# Patient Record
Sex: MALE | Race: WHITE | Employment: OTHER | URBAN - METROPOLITAN AREA
[De-identification: names, ages, dates, MRNs, and addresses within clinical notes are randomized per-mention and may not be internally consistent; named-entity substitution may affect disease eponyms.]

---

## 2017-01-05 ENCOUNTER — HOSPITAL ENCOUNTER (OUTPATIENT)
Dept: LAB | Age: 72
Discharge: HOME | End: 2017-01-05
Payer: MEDICARE

## 2017-01-05 ENCOUNTER — OFFICE VISIT (OUTPATIENT)
Dept: UROLOGY | Age: 72
End: 2017-01-05

## 2017-01-05 VITALS
WEIGHT: 253 LBS | SYSTOLIC BLOOD PRESSURE: 138 MMHG | BODY MASS INDEX: 35.42 KG/M2 | DIASTOLIC BLOOD PRESSURE: 76 MMHG | TEMPERATURE: 96.9 F | HEART RATE: 95 BPM | HEIGHT: 71 IN | OXYGEN SATURATION: 96 %

## 2017-01-05 DIAGNOSIS — R35.0 URINARY FREQUENCY: ICD-10-CM

## 2017-01-05 DIAGNOSIS — N41.1 CHRONIC PROSTATITIS: Primary | ICD-10-CM

## 2017-01-05 DIAGNOSIS — N40.1 BENIGN PROSTATIC HYPERPLASIA WITH LOWER URINARY TRACT SYMPTOMS, UNSPECIFIED MORPHOLOGY: ICD-10-CM

## 2017-01-05 DIAGNOSIS — Z01.818 PREOPERATIVE TESTING: ICD-10-CM

## 2017-01-05 LAB
ANION GAP BLD CALC-SCNC: 7 MMOL/L (ref 3–18)
BUN SERPL-MCNC: 30 MG/DL (ref 7–18)
BUN/CREAT SERPL: 17 (ref 12–20)
CALCIUM SERPL-MCNC: 8.9 MG/DL (ref 8.5–10.1)
CHLORIDE SERPL-SCNC: 99 MMOL/L (ref 100–108)
CO2 SERPL-SCNC: 31 MMOL/L (ref 21–32)
CREAT SERPL-MCNC: 1.78 MG/DL (ref 0.6–1.3)
ERYTHROCYTE [DISTWIDTH] IN BLOOD BY AUTOMATED COUNT: 13.3 % (ref 11.6–14.5)
GLUCOSE SERPL-MCNC: 68 MG/DL (ref 74–99)
HCT VFR BLD AUTO: 45.2 % (ref 36–48)
HGB BLD-MCNC: 15.2 G/DL (ref 13–16)
MCH RBC QN AUTO: 29.7 PG (ref 24–34)
MCHC RBC AUTO-ENTMCNC: 33.6 G/DL (ref 31–37)
MCV RBC AUTO: 88.3 FL (ref 74–97)
PLATELET # BLD AUTO: 198 K/UL (ref 135–420)
PMV BLD AUTO: 10.8 FL (ref 9.2–11.8)
POTASSIUM SERPL-SCNC: 4.5 MMOL/L (ref 3.5–5.5)
RBC # BLD AUTO: 5.12 M/UL (ref 4.7–5.5)
SODIUM SERPL-SCNC: 137 MMOL/L (ref 136–145)
WBC # BLD AUTO: 9.5 K/UL (ref 4.6–13.2)

## 2017-01-05 PROCEDURE — 80048 BASIC METABOLIC PNL TOTAL CA: CPT | Performed by: UROLOGY

## 2017-01-05 PROCEDURE — 85027 COMPLETE CBC AUTOMATED: CPT | Performed by: UROLOGY

## 2017-01-05 RX ORDER — BISMUTH SUBSALICYLATE 262 MG
1 TABLET,CHEWABLE ORAL DAILY
COMMUNITY
End: 2017-01-06

## 2017-01-05 RX ORDER — ACETAMINOPHEN 325 MG/1
TABLET ORAL
COMMUNITY

## 2017-01-05 RX ORDER — SODIUM CHLORIDE 9 MG/ML
100 INJECTION, SOLUTION INTRAVENOUS CONTINUOUS
Status: CANCELLED | OUTPATIENT
Start: 2017-01-05 | End: 2017-01-06

## 2017-01-05 NOTE — PROGRESS NOTES
Mr. Rebel Jaeger has a reminder for a \"due or due soon\" health maintenance. I have asked that he contact his primary care provider for follow-up on this health maintenance.

## 2017-01-05 NOTE — PATIENT INSTRUCTIONS
PaperShare Activation    Thank you for requesting access to PaperShare. Please follow the instructions below to securely access and download your online medical record. PaperShare allows you to send messages to your doctor, view your test results, renew your prescriptions, schedule appointments, and more. How Do I Sign Up? 1. In your internet browser, go to [email or web address]. 2. Click on the First Time User? Click Here link in the Sign In box. You will see the New Member Sign Up page. 3. Enter your PaperShare Access Code exactly as it appears below. You will not need to use this code after youve completed the sign-up process. If you do not sign up before the expiration date, you must request a new code. PaperShare Access Code: [email or web address]  Expires: 2017  7:53 AM (This is the date your PaperShare access code will )    4. Enter the last four digits of your Social Security Number (xxxx) and Date of Birth (mm/dd/yyyy) as indicated and click Submit. You will be taken to the next sign-up page. 5. Create a PaperShare ID. This will be your PaperShare login ID and cannot be changed, so think of one that is secure and easy to remember. 6. Create a PaperShare password. You can change your password at any time. 7. Enter your Password Reset Question and Answer. This can be used at a later time if you forget your password. 8. Enter your e-mail address. You will receive e-mail notification when new information is available in [address]. 9. Click Sign Up. You can now view and download portions of your medical record. 10. Click the Download Summary menu link to download a portable copy of your medical information. Additional Information    If you have questions, please visit the Frequently Asked Questions section of the PaperShare website at [email or web address] Remember, PaperShare is NOT to be used for urgent needs. For medical emergencies, dial 911.            Prostatitis: Care Instructions  Your Care Instructions    The prostate gland is a small, walnut-shaped organ. It lies just below a man's bladder. It surrounds the urethra, the tube that carries urine through the penis and out of the body. Prostatitis is a painful condition caused by inflammation or infection of the prostate gland. Sometimes the condition is caused by bacteria, but often the cause is not known. Prostatitis caused by bacteria usually is treated with self-care and antibiotics. Follow-up care is a key part of your treatment and safety. Be sure to make and go to all appointments, and call your doctor if you are having problems. It's also a good idea to know your test results and keep a list of the medicines you take. How can you care for yourself at home? · If your doctor prescribed antibiotics, take them as directed. Do not stop taking them just because you feel better. You need to take the full course of antibiotics. · Take an over-the-counter pain medicine, such as acetaminophen (Tylenol), ibuprofen (Advil, Motrin), or naproxen (Aleve). Be safe with medicines. Read and follow all instructions on the label. · Take warm baths to help soothe pain. · Straining to pass stools can hurt when your prostate is inflamed. Avoid constipation. ¨ Include fruits, vegetables, beans, and whole grains in your diet each day. These foods are high in fiber. ¨ Drink plenty of fluids, enough so that your urine is light yellow or clear like water. If you have kidney, heart, or liver disease and have to limit fluids, talk with your doctor before you increase the amount of fluids you drink. ¨ Get some exercise every day. Build up slowly to 30 to 60 minutes a day on 5 or more days of the week. ¨ Take a fiber supplement, such as Citrucel or Metamucil, every day if needed. Read and follow all instructions on the label. ¨ Schedule time each day for a bowel movement. Having a daily routine may help.  Take your time and do not strain when having a bowel movement. · Avoid alcohol, caffeine, and spicy foods, especially if they make your symptoms worse. When should you call for help? Call your doctor now or seek immediate medical care if:  · You have symptoms of a urinary infection. For example:  ¨ You have blood or pus in your urine. ¨ You have pain in your back just below your rib cage. This is called flank pain. ¨ You have a fever, chills, or body aches. ¨ It hurts to urinate. ¨ You have groin or belly pain. Watch closely for changes in your health, and be sure to contact your doctor if:  · You have trouble starting to urinate or cannot empty your bladder completely. · You do not get better as expected. Where can you learn more? Go to http://azalia-chloé.info/. Enter W753 in the search box to learn more about \"Prostatitis: Care Instructions. \"  Current as of: May 24, 2016  Content Version: 11.1  © 6189-9658 [employer]. Care instructions adapted under license by [employer] (which disclaims liability or warranty for this information). If you have questions about a medical condition or this instruction, always ask your healthcare professional. [place] [address] any warranty or liability for your use of this information.

## 2017-01-06 NOTE — PROGRESS NOTES
Marcus Lai 66 y.o. male     Mr. Gi Tapia seen today for symptomatic BPH with history of chronic prostatitis treated with periodic 3 week courses of Cipro antibiotic therapy-now on alpha-blocker treatment with Flomax  which has not alleviated irritative voiding symptoms-here today for PVR assessment of voiding efficiency-cystoscopy assessing irritative voiding symptoms in June 2016 showed unobstructed prostate channel with normal urothelium of the bladder and urethra    Cystoscopy 28 June 2016 showed normal urothelium with unobstructed prostate channel    PSA 0.8 in May 2016      Irritative voiding symptoms progressively worsening over the past 6 months  Patient complains of dysuria with daytime frequency Q2 hours and nocturia 4-5 times per night-symptoms not alleviated significantly by alpha-blocker treatment Flomax   no gross hematuria-No history of fever flank pain or pain in the perineum     Patient has extensive history of perirectal abscess since 2014 requiring numerous drainage procedures under general anesthesia-currently managed by maintenance indwelling rubber drain     Review of Systems:    CNS: No seizure syncope headaches or dizziness no visual changes  Respiratory: No wheezing or shortness of breath  Cardiovascular:Coronary artery disease status post CABG 2014/Hypertension  Intestinal:No dyspepsia diarrhea or constipation  Urinary:Irritative voiding symptoms    Skeletal: No bone or joint pain  Endocrine:Diabetes  Other:      Allergies: Allergies   Allergen Reactions    Retaine Cmc [Carboxymethylcellulose Sodium] Other (comments)      Medications:    Current Outpatient Prescriptions   Medication Sig Dispense Refill    multivitamin (ONE A DAY) tablet Take 1 Tab by mouth daily.  acetaminophen (TYLENOL) 325 mg tablet Take  by mouth every four (4) hours as needed for Pain.       furosemide (LASIX) 40 mg tablet       SPIRIVA WITH HANDIHALER 18 mcg inhalation capsule Take 1 Cap by inhalation daily.      tamsulosin (FLOMAX) 0.4 mg capsule Take 0.4 mg by mouth daily.  docusate sodium (COLACE) 100 mg capsule Take 100 mg by mouth two (2) times a day.  omeprazole (PRILOSEC) 20 mg capsule Take 20 mg by mouth daily.  ramipril (ALTACE) 2.5 mg capsule Take 2.5 mg by mouth daily.  carvedilol (COREG) 6.25 mg tablet Take 6.25 mg by mouth two (2) times daily (with meals).  simvastatin (ZOCOR) 20 mg tablet Take 20 mg by mouth nightly.  metFORMIN (GLUCOPHAGE) 1,000 mg tablet Take 1,000 mg by mouth two (2) times daily (with meals).  oxyCODONE-acetaminophen (PERCOCET 10)  mg per tablet Take 1 Tab by mouth every eight (8) hours as needed for Pain. Max Daily Amount: 3 Tabs. 90 Tab 0    PROAIR HFA 90 mcg/actuation inhaler Take 1 Puff by inhalation.  amitriptyline (ELAVIL) 50 mg tablet TAKE ONE TABLET BY MOUTH ONCE DAILY AT  NIGHT 30 Tab 0    ciprofloxacin HCl (CIPRO) 500 mg tablet       rOPINIRole (REQUIP) 1 mg tablet Take 1 mg by mouth.  HYDROmorphone (DILAUDID) 4 mg tablet Take 1 Tab by mouth every six (6) hours as needed for Pain. Max Daily Amount: 16 mg. 120 Tab 0    rOPINIRole (REQUIP) 0.25 mg tablet       oxyCODONE-acetaminophen (PERCOCET)  mg per tablet Take one tablet by mouth every 4-6 hours as needed for pain. 150 Tab 0    methylPREDNISolone (MEDROL DOSEPACK) 4 mg tablet Per dose pack instructions 1 Package 0    aspirin delayed-release 81 mg tablet Take 81 mg by mouth daily.  furosemide (LASIX) 80 mg tablet Take  by mouth daily.            Past Medical History   Diagnosis Date    Adverse effect of anesthesia      Will discuss on admit    Anal fistula and Anal tag     CAD (coronary artery disease)     Diabetes ([facility] [address]     Emphysema     Follow-up examination following surgery     Unspecified adverse effect of anesthesia      Can not take Ketamine    Unspecified sleep apnea      no cpap      Past Surgical History   Procedure Laterality Date  Hx back surgery  2001    Hx other surgical  2011     \"perirectal\"    Hx hernia repair  2009    Hx gi       Excision of anal tag    Hx gi       Modified Harrison procedure    Pr cardiac surg procedure unlist  1/2014     [id number]  repaired valve    Hx lumbar laminectomy      Hx lumbar fusion       Family History   Problem Relation Age of Onset    No Known Problems Mother     No Known Problems Father         Physical Examination: Well-nourished mature male in no apparent distress  Normal prostate by DEDRA in May 2016    Urinalysis: No specimen today    PVR today 76 cc    Impression: Chronic prostatitis                        -Symptomatic BPH responding favorably to alpha-blocker treatment with Flomax                    -        Plan: Flomax 0.4 mg daily           TURP             Counseling Note:  Indications for an technique of TURP have been described and discussed-patient understands and accepts the risks of bleeding, infection, obstruction, persistence of irritative voiding symptoms as well as retrograde ejaculation with  consequent infertility-patient's irritative voiding symptoms have not  responded favorably to medical management-TURP may not  alleviate irritative symptoms but if prostate microabscesses are responsible for patient's symptoms drainage by TURP should provide significant relief    rtc 1 week postop TURP      More than 1/2 of this 40 minute visit was spent in counselling and coordination of care, as described above. Lynette Angelo MD  -electronically signed-    PLEASE NOTE:  This document has been produced using voice recognition software. Unrecognized errors in transcription may be present.

## 2017-01-12 ENCOUNTER — ANESTHESIA EVENT (OUTPATIENT)
Dept: SURGERY | Age: 72
End: 2017-01-12
Payer: MEDICARE

## 2017-01-13 ENCOUNTER — SURGERY (OUTPATIENT)
Age: 72
End: 2017-01-13

## 2017-01-13 ENCOUNTER — ANESTHESIA (OUTPATIENT)
Dept: SURGERY | Age: 72
End: 2017-01-13
Payer: MEDICARE

## 2017-01-13 ENCOUNTER — HOSPITAL ENCOUNTER (OUTPATIENT)
Age: 72
Setting detail: OUTPATIENT SURGERY
Discharge: HOME | End: 2017-01-13
Attending: UROLOGY | Admitting: UROLOGY
Payer: MEDICARE

## 2017-01-13 VITALS
SYSTOLIC BLOOD PRESSURE: 107 MMHG | TEMPERATURE: 96.9 F | DIASTOLIC BLOOD PRESSURE: 61 MMHG | HEIGHT: 71 IN | WEIGHT: 254 LBS | RESPIRATION RATE: 16 BRPM | BODY MASS INDEX: 35.56 KG/M2 | OXYGEN SATURATION: 95 % | HEART RATE: 74 BPM

## 2017-01-13 LAB
GLUCOSE BLD STRIP.AUTO-MCNC: 105 MG/DL (ref 70–110)
GLUCOSE BLD STRIP.AUTO-MCNC: 118 MG/DL (ref 70–110)

## 2017-01-13 PROCEDURE — 77030005520 HC CATH URETH FOL38 BARD -A: Performed by: UROLOGY

## 2017-01-13 PROCEDURE — 76210000021 HC REC RM PH II 0.5 TO 1 HR: Performed by: UROLOGY

## 2017-01-13 PROCEDURE — 74011250636 HC RX REV CODE- 250/636

## 2017-01-13 PROCEDURE — 77030012961 HC IRR KT CYSTO/TUR ICUM -A: Performed by: UROLOGY

## 2017-01-13 PROCEDURE — 77030018734 HC ELECTRD LP CUT STOR -C: Performed by: UROLOGY

## 2017-01-13 PROCEDURE — 77030005538 HC CATH URETH FOL44 BARD -B: Performed by: UROLOGY

## 2017-01-13 PROCEDURE — 77030031458 HC ELECTRD TUR BTTN OCOA -F: Performed by: UROLOGY

## 2017-01-13 PROCEDURE — G0416 PROSTATE BIOPSY, ANY MTHD: HCPCS | Performed by: UROLOGY

## 2017-01-13 PROCEDURE — 74011250637 HC RX REV CODE- 250/637: Performed by: NURSE ANESTHETIST, CERTIFIED REGISTERED

## 2017-01-13 PROCEDURE — 77030029290 HC ELECTRD LP CUT OCOA -F: Performed by: UROLOGY

## 2017-01-13 PROCEDURE — 82962 GLUCOSE BLOOD TEST: CPT

## 2017-01-13 PROCEDURE — 77030011640 HC PAD GRND REM COVD -A: Performed by: UROLOGY

## 2017-01-13 PROCEDURE — 77030018836 HC SOL IRR NACL ICUM -A: Performed by: UROLOGY

## 2017-01-13 PROCEDURE — 77030020782 HC GWN BAIR PAWS FLX 3M -B: Performed by: UROLOGY

## 2017-01-13 PROCEDURE — 77030032490 HC SLV COMPR SCD KNE COVD -B: Performed by: UROLOGY

## 2017-01-13 PROCEDURE — 77030012012 HC AIRWY OP SFT TELE -A: Performed by: NURSE ANESTHETIST, CERTIFIED REGISTERED

## 2017-01-13 PROCEDURE — 76060000032 HC ANESTHESIA 0.5 TO 1 HR: Performed by: UROLOGY

## 2017-01-13 PROCEDURE — 74011250636 HC RX REV CODE- 250/636: Performed by: NURSE ANESTHETIST, CERTIFIED REGISTERED

## 2017-01-13 PROCEDURE — 88305 TISSUE EXAM BY PATHOLOGIST: CPT | Performed by: UROLOGY

## 2017-01-13 PROCEDURE — 77030012863 HC BG URIN LEG HOLL -A: Performed by: UROLOGY

## 2017-01-13 PROCEDURE — 76210000006 HC OR PH I REC 0.5 TO 1 HR: Performed by: UROLOGY

## 2017-01-13 PROCEDURE — 77030020015 HC EVAC BLDR UROVAC BSC -B: Performed by: UROLOGY

## 2017-01-13 PROCEDURE — 76010000138 HC OR TIME 0.5 TO 1 HR: Performed by: UROLOGY

## 2017-01-13 PROCEDURE — 74011250636 HC RX REV CODE- 250/636: Performed by: UROLOGY

## 2017-01-13 PROCEDURE — 77030010509 HC AIRWY LMA MSK TELE -A: Performed by: NURSE ANESTHETIST, CERTIFIED REGISTERED

## 2017-01-13 RX ORDER — FENTANYL CITRATE 50 UG/ML
INJECTION, SOLUTION INTRAMUSCULAR; INTRAVENOUS AS NEEDED
Status: DISCONTINUED | OUTPATIENT
Start: 2017-01-13 | End: 2017-01-13 | Stop reason: HOSPADM

## 2017-01-13 RX ORDER — MAGNESIUM SULFATE 100 %
4 CRYSTALS MISCELLANEOUS AS NEEDED
Status: DISCONTINUED | OUTPATIENT
Start: 2017-01-13 | End: 2017-01-13 | Stop reason: HOSPADM

## 2017-01-13 RX ORDER — OXYCODONE AND ACETAMINOPHEN 5; 325 MG/1; MG/1
1 TABLET ORAL
Qty: 20 TAB | Refills: 0 | Status: SHIPPED | OUTPATIENT
Start: 2017-01-13 | End: 2018-01-02 | Stop reason: ALTCHOICE

## 2017-01-13 RX ORDER — DOCUSATE SODIUM 100 MG/1
100 CAPSULE, LIQUID FILLED ORAL 2 TIMES DAILY
Qty: 10 CAP | Refills: 0 | Status: SHIPPED | OUTPATIENT
Start: 2017-01-13 | End: 2017-01-18

## 2017-01-13 RX ORDER — DEXTROSE 50 % IN WATER (D50W) INTRAVENOUS SYRINGE
25-50 AS NEEDED
Status: DISCONTINUED | OUTPATIENT
Start: 2017-01-13 | End: 2017-01-13 | Stop reason: HOSPADM

## 2017-01-13 RX ORDER — FENTANYL CITRATE 50 UG/ML
25 INJECTION, SOLUTION INTRAMUSCULAR; INTRAVENOUS
Status: DISCONTINUED | OUTPATIENT
Start: 2017-01-13 | End: 2017-01-13 | Stop reason: HOSPADM

## 2017-01-13 RX ORDER — SODIUM CHLORIDE 0.9 % (FLUSH) 0.9 %
5-10 SYRINGE (ML) INJECTION AS NEEDED
Status: DISCONTINUED | OUTPATIENT
Start: 2017-01-13 | End: 2017-01-13 | Stop reason: HOSPADM

## 2017-01-13 RX ORDER — CIPROFLOXACIN 250 MG/1
250 TABLET, FILM COATED ORAL EVERY 12 HOURS
Qty: 10 TAB | Refills: 0 | Status: SHIPPED | OUTPATIENT
Start: 2017-01-13 | End: 2017-01-18

## 2017-01-13 RX ORDER — SODIUM CHLORIDE 9 MG/ML
100 INJECTION, SOLUTION INTRAVENOUS CONTINUOUS
Status: DISCONTINUED | OUTPATIENT
Start: 2017-01-13 | End: 2017-01-13 | Stop reason: HOSPADM

## 2017-01-13 RX ORDER — INSULIN LISPRO 100 [IU]/ML
INJECTION, SOLUTION INTRAVENOUS; SUBCUTANEOUS ONCE
Status: DISCONTINUED | OUTPATIENT
Start: 2017-01-13 | End: 2017-01-13 | Stop reason: HOSPADM

## 2017-01-13 RX ORDER — SODIUM CHLORIDE, SODIUM LACTATE, POTASSIUM CHLORIDE, CALCIUM CHLORIDE 600; 310; 30; 20 MG/100ML; MG/100ML; MG/100ML; MG/100ML
75 INJECTION, SOLUTION INTRAVENOUS CONTINUOUS
Status: DISCONTINUED | OUTPATIENT
Start: 2017-01-13 | End: 2017-01-13 | Stop reason: HOSPADM

## 2017-01-13 RX ORDER — FAMOTIDINE 20 MG/1
20 TABLET, FILM COATED ORAL ONCE
Status: COMPLETED | OUTPATIENT
Start: 2017-01-13 | End: 2017-01-13

## 2017-01-13 RX ORDER — MIDAZOLAM HYDROCHLORIDE 1 MG/ML
INJECTION, SOLUTION INTRAMUSCULAR; INTRAVENOUS AS NEEDED
Status: DISCONTINUED | OUTPATIENT
Start: 2017-01-13 | End: 2017-01-13 | Stop reason: HOSPADM

## 2017-01-13 RX ORDER — CEFAZOLIN SODIUM 2 G/50ML
2 SOLUTION INTRAVENOUS ONCE
Status: COMPLETED | OUTPATIENT
Start: 2017-01-13 | End: 2017-01-13

## 2017-01-13 RX ORDER — PROPOFOL 10 MG/ML
INJECTION, EMULSION INTRAVENOUS AS NEEDED
Status: DISCONTINUED | OUTPATIENT
Start: 2017-01-13 | End: 2017-01-13 | Stop reason: HOSPADM

## 2017-01-13 RX ORDER — SODIUM CHLORIDE 0.9 % (FLUSH) 0.9 %
5-10 SYRINGE (ML) INJECTION EVERY 8 HOURS
Status: DISCONTINUED | OUTPATIENT
Start: 2017-01-13 | End: 2017-01-13 | Stop reason: HOSPADM

## 2017-01-13 RX ORDER — ONDANSETRON 2 MG/ML
INJECTION INTRAMUSCULAR; INTRAVENOUS AS NEEDED
Status: DISCONTINUED | OUTPATIENT
Start: 2017-01-13 | End: 2017-01-13 | Stop reason: HOSPADM

## 2017-01-13 RX ADMIN — FENTANYL CITRATE 25 MCG: 50 INJECTION, SOLUTION INTRAMUSCULAR; INTRAVENOUS at 11:06

## 2017-01-13 RX ADMIN — ONDANSETRON 4 MG: 2 INJECTION INTRAMUSCULAR; INTRAVENOUS at 11:34

## 2017-01-13 RX ADMIN — FAMOTIDINE 20 MG: 20 TABLET, FILM COATED ORAL at 07:55

## 2017-01-13 RX ADMIN — SODIUM CHLORIDE, SODIUM LACTATE, POTASSIUM CHLORIDE, AND CALCIUM CHLORIDE 75 ML/HR: 600; 310; 30; 20 INJECTION, SOLUTION INTRAVENOUS at 07:54

## 2017-01-13 RX ADMIN — FENTANYL CITRATE 50 MCG: 50 INJECTION, SOLUTION INTRAMUSCULAR; INTRAVENOUS at 11:40

## 2017-01-13 RX ADMIN — CEFAZOLIN SODIUM 2 G: 2 SOLUTION INTRAVENOUS at 10:52

## 2017-01-13 RX ADMIN — PROPOFOL 140 MG: 10 INJECTION, EMULSION INTRAVENOUS at 10:58

## 2017-01-13 RX ADMIN — MIDAZOLAM HYDROCHLORIDE 1 MG: 1 INJECTION, SOLUTION INTRAMUSCULAR; INTRAVENOUS at 10:51

## 2017-01-13 RX ADMIN — FENTANYL CITRATE 25 MCG: 50 INJECTION, SOLUTION INTRAMUSCULAR; INTRAVENOUS at 11:10

## 2017-01-13 NOTE — IP AVS SNAPSHOT
Lillian Aguilera 
 
 
 [address] Patient: Marley Carter MRN: [id number] :1945 You are allergic to the following Allergen Reactions Retaine Cmc (Carboxymethylcellulose Sodium) Other (comments) Recent Documentation Height Weight BMI Smoking Status 1.803 m 115.2 kg 35.43 kg/m2 Current Every Day Smoker Emergency Contacts Name Discharge Info Relation Home Work Mobile ElzaconorAngela DISCHARGE CAREGIVER [3] Spouse [3] [phone] About your hospitalization You were admitted on:  2017 You last received care in the:  [facility] PH[facility] 2 RECOVERY You were discharged on:  2017 Unit phone number:  [phone] Why you were hospitalized Your primary diagnosis was:  Not on File Providers Seen During Your Hospitalizations Provider Role Specialty Primary office phone Jina Bence, MD Attending Provider Urology [phone] Your Primary Care Physician (PCP) Primary Care Physician Office Phone Office Fax Wyashley Santanatings [phone] Follow-up Information Follow up With Details Comments Contact Info In 3 days Tip Campa MD   [facility] [address] Dr[address] [place] [address] 
[phone] Your Appointments 2017 11:00 AM EST  
POST OP with Jina Bence, MD  
[facility] [place] [address]  
[phone] Current Discharge Medication List  
  
START taking these medications Dose & Instructions Dispensing Information Comments Morning Noon Evening Bedtime  
 ciprofloxacin HCl 250 mg tablet Commonly known as:  CIPRO Your next dose is: Today, Tomorrow Other:  _________  Dose:  250 mg  
 Take 1 Tab by mouth every twelve (12) hours for 5 days. Quantity:  10 Tab Refills:  0 CONTINUE these medications which have CHANGED Dose & Instructions Dispensing Information Comments Morning Noon Evening Bedtime * COLACE 100 mg capsule Generic drug:  docusate sodium What changed:  Another medication with the same name was added. Make sure you understand how and when to take each. Your next dose is: Today, Tomorrow Other:  _________ Dose:  100 mg Take 100 mg by mouth two (2) times a day. Refills:  0  
     
   
   
   
  
 * docusate sodium 100 mg capsule Commonly known as:  Maira Metzger What changed: You were already taking a medication with the same name, and this prescription was added. Make sure you understand how and when to take each. Your next dose is: Today, Tomorrow Other:  _________ Dose:  100 mg Take 1 Cap by mouth two (2) times a day for 5 days. Quantity:  10 Cap Refills:  0  
     
   
   
   
  
 * oxyCODONE-acetaminophen  mg per tablet Commonly known as:  PERCOCET 10 What changed:  Another medication with the same name was added. Make sure you understand how and when to take each. Your next dose is: Today, Tomorrow Other:  _________ Dose:  1 Tab Take 1 Tab by mouth every eight (8) hours as needed for Pain. Max Daily Amount: 3 Tabs. Quantity:  90 Tab Refills:  0  
     
   
   
   
  
 * oxyCODONE-acetaminophen 5-325 mg per tablet Commonly known as:  PERCOCET What changed: You were already taking a medication with the same name, and this prescription was added. Make sure you understand how and when to take each. Your next dose is: Today, Tomorrow Other:  _________ Dose:  1 Tab Take 1 Tab by mouth every four (4) hours as needed for Pain. Max Daily Amount: 6 Tabs. Quantity:  20 Tab Refills:  0 * Notice: This list has 4 medication(s) that are the same as other medications prescribed for you. Read the directions carefully, and ask your doctor or other care provider to review them with you. CONTINUE these medications which have NOT CHANGED Dose & Instructions Dispensing Information Comments Morning Noon Evening Bedtime ALTACE 2.5 mg capsule Generic drug:  ramipril Your next dose is: Today, Tomorrow Other:  _________ Dose:  2.5 mg Take 2.5 mg by mouth daily. Refills:  0  
     
   
   
   
  
 amitriptyline 50 mg tablet Commonly known as:  ELAVIL Your next dose is: Today, Tomorrow Other:  _________ TAKE ONE TABLET BY MOUTH ONCE DAILY AT  NIGHT Quantity:  30 Tab Refills:  0  
     
   
   
   
  
 aspirin delayed-release 81 mg tablet Your next dose is: Today, Tomorrow Other:  _________ Dose:  81 mg Take 81 mg by mouth daily. Refills:  0 COREG 6.25 mg tablet Generic drug:  carvedilol Your next dose is: Today, Tomorrow Other:  _________ Dose:  6.25 mg Take 6.25 mg by mouth two (2) times daily (with meals). Refills:  0  
     
   
   
   
  
 furosemide 80 mg tablet Commonly known as:  LASIX Your next dose is: Today, Tomorrow Other:  _________ Take  by mouth daily. Refills:  0  
     
   
   
   
  
 metFORMIN 1,000 mg tablet Commonly known as:  GLUCOPHAGE Your next dose is: Today, Tomorrow Other:  _________ Dose:  1000 mg Take 1,000 mg by mouth two (2) times daily (with meals). Refills:  0  
     
   
   
   
  
 omeprazole 20 mg capsule Commonly known as:  PRILOSEC Your next dose is: Today, Tomorrow Other:  _________ Dose:  20 mg Take 20 mg by mouth daily. Refills:  0 PROAIR HFA 90 mcg/actuation inhaler Generic drug:  albuterol Your next dose is: Today, Tomorrow Other:  _________ Dose:  1 Puff Take 1 Puff by inhalation. Refills:  0  
     
   
   
   
  
 rOPINIRole 0.25 mg tablet Commonly known as:  Mallissa Poles Your next dose is: Today, Tomorrow Other:  _________ Refills:  0 SPIRIVA WITH HANDIHALER 18 mcg inhalation capsule Generic drug:  tiotropium Your next dose is: Today, Tomorrow Other:  _________ Dose:  1 Cap Take 1 Cap by inhalation daily. Refills:  0  
     
   
   
   
  
 TYLENOL 325 mg tablet Generic drug:  acetaminophen Your next dose is: Today, Tomorrow Other:  _________ Take  by mouth every four (4) hours as needed for Pain. Refills:  0 WELLBUTRIN PO Your next dose is: Today, Tomorrow Other:  _________ Take  by mouth two (2) times a day. Refills:  0 ZOCOR 20 mg tablet Generic drug:  simvastatin Your next dose is: Today, Tomorrow Other:  _________ Dose:  20 mg Take 20 mg by mouth nightly. Refills:  0 Where to Get Your Medications Information on where to get these meds will be given to you by the nurse or doctor. ! Ask your nurse or doctor about these medications  
  ciprofloxacin HCl 250 mg tablet  
 docusate sodium 100 mg capsule  
 oxyCODONE-acetaminophen 5-325 mg per tablet Discharge Instructions Light activity Reg diet Pérez to leg bag Rx Cipro, Colace, Percocet Return to office in 3 days for pérez removal 
 
Marilou Garcia MD 
 
 
DISCHARGE SUMMARY from Nurse The following personal items are in your possession at time of discharge: 
 
Dental Appliances: Uppers Visual Aid: None Home Medications: None Jewelry: None Clothing: Jacket/Coat, Pants, Shirt, Undergarments, Socks, Footwear Other Valuables: None PATIENT INSTRUCTIONS: 
 
 
F-face looks uneven A-arms unable to move or move unevenly S-speech slurred or non-existent T-time-call 911 as soon as signs and symptoms begin-DO NOT go Back to bed or wait to see if you get better-TIME IS BRAIN. Warning Signs of HEART ATTACK Call 911 if you have these symptoms: 
? Chest discomfort. Most heart attacks involve discomfort in the center of the chest that lasts more than a few minutes, or that goes away and comes back. It can feel like uncomfortable pressure, squeezing, fullness, or pain. ? Discomfort in other areas of the upper body. Symptoms can include pain or discomfort in one or both arms, the back, neck, jaw, or stomach. ? Shortness of breath with or without chest discomfort. ? Other signs may include breaking out in a cold sweat, nausea, or lightheadedness. Don't wait more than five minutes to call [address]! Fast action can save your life. Calling 911 is almost always the fastest way to get lifesaving treatment. Emergency Medical Services staff can begin treatment when they arrive  up to an hour sooner than if someone gets to the hospital by car. Transurethral Resection of the Prostate (TURP): What to Expect at [facility] Your Recovery Transurethral resection of the prostate (TURP) is surgery to remove a section of the prostate gland. This is done when the prostate gland has grown too large. The prostate gland is a walnut-sized organ in men that grows around the urethra. The urethra is the tube that carries urine from the bladder, through the penis, to outside the body. The prostate gland produces most of the fluid in semen. You may need a urinary catheter for a short time.  A urinary catheter is a flexible plastic tube used to drain urine from your bladder when you cannot urinate on your own. If it is still in place when you go home, your doctor will give you instructions on how to care for your catheter. For several days after surgery, you may feel burning when you urinate. Your urine may be pink for 1 to 3 weeks after surgery. You also may have bladder cramps, or spasms. Your doctor may give you medicine to help control the spasms. You may still feel like you need to urinate often in the weeks after your surgery. It often takes up to 6 weeks for this to get better. Once you have healed, you may have less trouble urinating. You may have better control over starting and stopping your urine stream and feel like you get more relief when you urinate. Most men can return to work or many of their usual activities in 1 to 3 weeks. But for about 6 weeks, try to avoid heavy lifting and strenuous activities that might put extra pressure on your bladder. Most men still can have erections after surgery (if they were able to have them before surgery), but they may not ejaculate when they have an orgasm. Semen may go into the bladder instead of out through the penis. This is called retrograde ejaculation. This does not hurt and is not harmful to your health. But it may mean that you will not be able to father a child. If this is a concern, talk to your doctor about saving your sperm before the surgery. This care sheet gives you a general idea about how long it will take for you to recover. But each person recovers at a different pace. Follow the steps below to get better as quickly as possible. How can you care for yourself at home? Activity · Rest when you feel tired. Getting enough sleep will help you recover. · Try to walk each day. Start by walking a little more than you did the day before. Bit by bit, increase the amount you walk. Walking boosts blood flow and helps prevent pneumonia and constipation.  
· Avoid strenuous activities for 6 weeks after surgery, or until your doctor says it is okay. This includes bicycle riding, jogging, weight lifting, or aerobic exercise. · For 6 weeks, avoid lifting anything that would make you strain. This may include a child, heavy grocery bags and milk containers, a heavy briefcase or backpack, cat litter or dog food bags, or a vacuum . · Ask your doctor when you can drive again. · You will probably need to take 1 to 3 weeks off from work. It depends on the type of work you do and how you feel. · Do not put anything in your rectum, such as an enema or suppository, for 4 to 6 weeks after the surgery. · You may shower and take baths when your doctor says it is okay. · Ask your doctor when it is okay for you to have sex. Diet · You can eat your normal diet. If your stomach is upset, try bland, low-fat foods like plain rice, broiled chicken, toast, and yogurt. · Drink plenty of fluids (unless your doctor tells you not to). · You may notice that your bowel movements are not regular right after your surgery. This is common. Try to avoid constipation and straining with bowel movements. You may want to take a fiber supplement every day. If you have not had a bowel movement after a couple of days, ask your doctor about taking a mild laxative. Medicines · Your doctor will tell you if and when you can restart your medicines. He or she will also give you instructions about taking any new medicines. · If you take blood thinners, such as warfarin (Coumadin), clopidogrel (Plavix), or aspirin, be sure to talk to your doctor. He or she will tell you if and when to start taking those medicines again. Make sure that you understand exactly what your doctor wants you to do. · Be safe with medicines. Take pain medicines exactly as directed. ¨ If the doctor gave you a prescription medicine for pain, take it as prescribed. ¨ If you are not taking a prescription pain medicine, ask your doctor if you can take an over-the-counter medicine. · If you think your pain medicine is making you sick to your stomach: 
¨ Take your medicine after meals (unless your doctor has told you not to). ¨ Ask your doctor for a different pain medicine. · If your doctor prescribed antibiotics, take them as directed. Do not stop taking them just because you feel better. You need to take the full course of antibiotics. Follow-up care is a key part of your treatment and safety. Be sure to make and go to all appointments, and call your doctor if you are having problems. It's also a good idea to know your test results and keep a list of the medicines you take. When should you call for help? Call 911 anytime you think you may need emergency care. For example, call if: 
· You passed out (lost consciousness). · You have severe trouble breathing. · You have sudden chest pain and shortness of breath, or you cough up blood. Call your doctor now or seek immediate medical care if: 
· You cannot urinate. · You are leaking or dripping urine. · You have pain that does not get better after you take pain medicine. · You are sick to your stomach or cannot keep fluids down. · You have pain in your back just below your rib cage. This is called flank pain. · You have a fever, chills, or body aches. · You have signs of a blood clot, such as: 
¨ Pain in your calf, back of the knee, thigh, or groin. ¨ Redness and swelling in your leg or groin. Watch closely for changes in your health, and be sure to contact your doctor if: 
· You do not have a bowel movement after taking a laxative. Where can you learn more? Go to http://azalia-chloé.info/. Enter I480 in the search box to learn more about \"Transurethral Resection of the Prostate (TURP): What to Expect at Home. \" Current as of: May 24, 2016 Content Version: 11.1 © 8114-6997 [employer].  Care instructions adapted under license by [employer] (which disclaims liability or warranty for this information). If you have questions about a medical condition or this instruction, always ask your healthcare professional. [address] any warranty or liability for your use of this information. Oxycodone/Acetaminophen (By mouth) Acetaminophen (l-udjy-t-MIN-oh-fen), Oxycodone Hydrochloride (zc-f-CHS-done eric-droe-KLOR-hong) Treats moderate to moderately severe pain. This medicine is a narcotic pain reliever. Brand Name(s):Endocet, Percocet, Primlev, Roxicet, Xartemis XR There may be other brand names for this medicine. When This Medicine Should Not Be Used: This medicine is not right for everyone. Do not use it if you had an allergic reaction to acetaminophen or oxycodone, or if you have serious breathing problems (such as severe asthma, hypercarbia, respiratory depression) or paralytic ileus. How to Use This Medicine:  
Capsule, Liquid, Tablet, Long Acting Tablet · Your doctor will tell you how much medicine to use. Do not use more than directed. · Measure the oral liquid medicine with a marked measuring spoon, oral syringe, or medicine cup. · Swallow the extended-release tablet whole. Do not crush, break, or chew it. Do not lick or wet the tablet before placing it in your mouth. Do not give this medicine through a feeding tube. · This medicine should come with a Medication Guide. Ask your pharmacist for a copy if you do not have one. · Store the medicine in a closed container at room temperature, away from heat, moisture, and direct light. Ask your pharmacist about the best way to dispose of medicine you do not use. Drugs and Foods to Avoid: Ask your doctor or pharmacist before using any other medicine, including over-the-counter medicines, vitamins, and herbal products. · Do not use Xartemis XR if you have used an MAO inhibitor in the past 14 days. · Some medicines and foods can affect how this medicine works.  Tell your doctor if you are taking any of the following: ¨ Butorphanol, lamotrigine, nalbuphine, naltrexone, pentazocine, propranolol, zidovudine ¨ Birth control pills, a diuretic (water pill), muscle relaxants, a phenothiazine medicine (chlorpromazine, perphenazine, promethazine, prochlorperazine, thioridazine) · Do not drink alcohol while you are using this medicine. Acetaminophen can damage your liver, and alcohol can increase this risk. Do not take acetaminophen without asking your doctor if you have 3 or more drinks of alcohol every day. · Tell your doctor if you are using any medicine that makes you sleepy, such as allergy medicine or other narcotic pain medicine. Warnings While Using This Medicine: · Tell your doctor if you are pregnant, or if you have kidney disease, liver disease, heart disease, low blood pressure, breathing problems or lung disease, especially if you have asthma, COPD, cor pulmonale, or kyphoscoliosis (curved spine that causes breathing trouble). Tell your doctor if you have urination problems, an underactive thyroid, Coffey disease, pancreas or prostate problems, or a stomach disorder. Tell your doctor if you have a history of head injury or brain damage, seizures, or alcohol or drug abuse. Tell your doctor if you are allergic to codeine. · Do not breastfeed while you are using this medicine, unless otherwise directed by your doctor. · This medicine may cause the following problems: 
¨ Liver problems ¨ Serious skin reactions ¨ Low blood pressure · If you take this medicine for more than a few weeks, do not stop taking it suddenly. Your doctor will need to slowly decrease your dose before you stop it completely. · Get emergency help immediately if you think you may have taken too much of this medicine. Signs of an overdose include shallow breathing, fainting, confusion, nausea, vomiting, pinpoint pupils of the eyes, or pale or blue lips, fingernails, or skin. · This medicine may make you dizzy or drowsy. Do not drive or do anything that could be dangerous until you know how this medicine affects you. · This medicine contains acetaminophen. Read the labels of all other medicines you are using to see if they also contain acetaminophen, or ask your doctor or pharmacist. Harriet Savers not use more than 4 grams (4,000 milligrams) total of acetaminophen in one day. · This medicine can be habit-forming. Do not use more than your prescribed dose. Call your doctor if you think your medicine is not working. · This medicine may cause constipation, especially with long-term use. Ask your doctor if you should use a laxative to prevent and treat constipation. · Keep all medicine out of the reach of children. Never share your medicine with anyone. Possible Side Effects While Using This Medicine:  
Call your doctor right away if you notice any of these side effects: · Allergic reaction: Itching or hives, swelling in your face or hands, swelling or tingling in your mouth or throat, chest tightness, trouble breathing · Dark urine or pale stools, nausea, vomiting, loss of appetite, stomach pain, yellow skin or eyes · Extreme weakness, shallow breathing, uneven heartbeat, seizures, sweating, or cold or clammy skin · Lightheadedness, dizziness, or fainting · Trouble breathing If you notice these less serious side effects, talk with your doctor: · Constipation · Headache · Mild lightheadedness, sleepiness, or drowsiness · Mild nausea or vomiting If you notice other side effects that you think are caused by this medicine, tell your doctor. Call your doctor for medical advice about side effects. You may report side effects to FDA at 7-178-FDA-4003 © 2016 [address] is for End User's use only and may not be sold, redistributed or otherwise used for commercial purposes. The above information is an  only.  It is not intended as medical advice for individual conditions or treatments. Talk to your doctor, nurse or pharmacist before following any medical regimen to see if it is safe and effective for you. Ciprofloxacin (By mouth) Ciprofloxacin (asf-etr-KFLU-a-sin) Treats infections. This medicine is a quinolone antibiotic. Brand Name(s):Cipro There may be other brand names for this medicine. When This Medicine Should Not Be Used: This medicine is not right for everyone. Do not use if you had an allergic reaction to ciprofloxacin or similar medicines. How to Use This Medicine:  
Liquid, Tablet, Long Acting Tablet · Take this medicine as directed, and take it at the same time each day. · You may take this medicine with or without food. Do not take this medicine with only a source of calcium, such as milk, yogurt, or juice that contains added calcium. You may have foods or drinks that contain calcium as part of a larger meal. 
· Swallow the tablet whole. Do not break, crush, or chew it. · Oral liquid: Shake for at least 15 seconds just before each use. The liquid has small beads floating in it. Do not chew the beads when you drink the liquid. Measure the oral liquid medicine with a marked measuring spoon, oral syringe, or medicine cup. · Drink extra fluids so you will urinate more often and help prevent kidney problems. · Take all of the medicine in your prescription to clear up your infection, even if you feel better after the first few doses. · This medicine should come with a Medication Guide. Ask your pharmacist for a copy if you do not have one. · Missed dose: Take a dose as soon as you remember. If it is almost time for your next dose, wait until then and take a regular dose. Do not take extra medicine to make up for a missed dose. · Store the medicine in a closed container at room temperature, away from heat, moisture, and direct light. Throw away any leftover liquid medicine after 14 days. Drugs and Foods to Avoid: Ask your doctor or pharmacist before using any other medicine, including over-the-counter medicines, vitamins, and herbal products. · Do not use this medicine together with tizanidine. · Some medicines and foods can affect how ciprofloxacin works. Tell your doctor if you are using theophylline or a steroid medicine (such as hydrocortisone, methylprednisolone, prednisone). · Tell your doctor if you are using clozapine, cyclosporine, duloxetine, lidocaine, methotrexate, olanzapine, pentoxifylline, phenytoin, probenecid, ropinirole, sildenafil, a blood thinner (such as warfarin), a diabetes medicine (such as glyburide), medicine for depression, medicine for mental illness, other medicine to treat an infection, NSAID pain medicine (such as aspirin, diclofenac, ibuprofen, naproxen, celecoxib), or medicine for heart rhythm problems (such as quinidine, procainamide, amiodarone, sotalol). · Some minerals and medicines can keep your body from absorbing this medicine. You may need to take ciprofloxacin at least 2 hours before or 6 hours after you take these medicines. These include magnesium, aluminum, calcium, zinc, iron, lanthanum, sevelamer, sucralfate, and didanosine. Ask your pharmacist for more information. · This medicine slows the digestion of caffeine, so it might affect you for longer than normal. 
Warnings While Using This Medicine: · Tell your doctor if you are pregnant or breastfeeding, or if you have kidney disease, liver disease, diabetes, heart disease, myasthenia gravis, or a history of heart rhythm problems (such as prolonged QT interval), seizures, or stroke. Tell your doctor if you have ever had tendon or joint problems, including rheumatoid arthritis, or if you have received a transplant. · This medicine may cause the following problems: 
¨ Tendinitis and tendon rupture (may happen after treatment ends) ¨ Liver damage ¨ Nerve damage in the arms or legs ¨ Severe diarrhea 
¨ Heart rhythm changes · This medicine may make you dizzy, drowsy, or lightheaded. Do not drive or do anything that could be dangerous until you know how this medicine affects you. · This medicine can cause diarrhea. Call your doctor if the diarrhea becomes severe, does not stop, or is bloody. Do not take any medicine to stop diarrhea until you have talked to your doctor. Diarrhea can occur 2 months or more after you stop taking this medicine. · This medicine may make your skin more sensitive to sunlight. Wear sunscreen. Do not use sunlamps or tanning beds. · Call your doctor if your symptoms do not improve or if they get worse. · Keep all medicine out of the reach of children. Never share your medicine with anyone. Possible Side Effects While Using This Medicine:  
Call your doctor right away if you notice any of these side effects: · Allergic reaction: Itching or hives, swelling in your face or hands, swelling or tingling in your mouth or throat, chest tightness, trouble breathing · Blistering, peeling, or red skin rash · Diarrhea that may contain blood · Fainting, dizziness, or lightheadedness · Fast, slow, or uneven heartbeat · Numbness, tingling, weakness, or burning pain in your hands, arms, legs, or feet · Pain, stiffness, swelling, or bruises around your ankle, leg, shoulder, or other joint · Seizures, severe headache, unusual thoughts or behaviors, trouble sleeping, confusion · Unusual bleeding, bruising, or weakness · Yellow skin or eyes If you notice other side effects that you think are caused by this medicine, tell your doctor. Call your doctor for medical advice about side effects. You may report side effects to FDA at 1-470-FDA-8472 © 2016 [address] is for End User's use only and may not be sold, redistributed or otherwise used for commercial purposes. The above information is an  only.  It is not intended as medical advice for individual conditions or treatments. Talk to your doctor, nurse or pharmacist before following any medical regimen to see if it is safe and effective for you. Laxative, Stimulant Combination (By mouth) Treats constipation by helping you have a bowel movement. Brand Name(s):DR. Canales Olive Laxative, Doc-Q-Lax, Doculax Plus, Dok Plus, Doxidan, Femintrol, [facility] Stool Softener & Laxative, Laxa-Basic Plus, Laxacin, Medi-Laxx, Perdiem, Aziza-Colace, [facility] [facility] Laxative and Stool Softener, [facility] P Col-Rite, Senexon-S There may be other brand names for this medicine. When This Medicine Should Not Be Used: You should not use this medicine if you have had an allergic reaction to senna, sennosides, docusate, casanthranol, or psyllium. Some brand names for these medicines are Correctol® stool softener, Ex-Lax®, Colace®, or Metamucil®. Make sure your doctor knows if you are allergic to any other laxative medicines. How to Use This Medicine:  
Tablet, Liquid Filled Capsule, Liquid, Granule, Capsule, Powder for Suspension · Your doctor will tell you how much medicine to use. Do not use more than directed. · If you have had a sudden change in your bowel movements in the past two weeks, ask your doctor before using this medicine. · Follow the instructions on the medicine label if you are using this medicine without a prescription. · Drink a full glass of water when you take this medicine. One full glass of water is about 8 ounces or 1 cup. Drinking 6 to 8 full glasses of water every day will help keep your bowel movements soft. · You might need to mix the granules or powder with water before you take each dose. Drink this mixture right away. Do not swallow the granules or powder dry unless the directions say you can. · Measure the oral liquid medicine with a marked measuring spoon, oral syringe, or medicine cup. · Use this medicine at bedtime, unless your doctor tells you otherwise. If a dose is missed: · Take a dose as soon as you remember. If it is almost time for your next dose, wait until then and take a regular dose. Do not take extra medicine to make up for a missed dose. How to Store and Dispose of This Medicine: · Store the medicine in a closed container at room temperature, away from heat, moisture, and direct light. · Ask your pharmacist, doctor, or health caregiver about the best way to dispose of any outdated medicine or medicine no longer needed. · Keep all medicine out of the reach of children. Never share your medicine with anyone. Drugs and Foods to Avoid: Ask your doctor or pharmacist before using any other medicine, including over-the-counter medicines, vitamins, and herbal products. · Make sure your doctor knows if you are also using mineral oil. · Some laxatives need to be taken 2 hours before or 2 hours after other medicines. If you need to take any other medicine, ask your health caregiver if you need to follow a special schedule. Warnings While Using This Medicine: · Make sure your doctor knows if you are pregnant or breast feeding. · Do not use this medicine if you have stomach pain, nausea, or vomiting, unless your health caregiver tells you to use it. · If you do not have a bowel movement after using this medicine, talk to your doctor. Most people will have a bowel movement within 6 to 12 hours after using this laxative. · You should not use this laxative for more than 1 week unless your doctor says it is okay. Laxatives may be habit-forming and can harm your bowels if you use them too long. Possible Side Effects While Using This Medicine:  
Call your doctor right away if you notice any of these side effects: · Bleeding from your rectum. · Skin rash. · Urine turns a different color. If you notice these less serious side effects, talk with your doctor: · Diarrhea, cramps, nausea, burping. If you notice other side effects that you think are caused by this medicine, tell your doctor. Call your doctor for medical advice about side effects. You may report side effects to FDA at 0-460-HSK-8052 © 2016 [address] is for End User's use only and may not be sold, redistributed or otherwise used for commercial purposes. The above information is an  only. It is not intended as medical advice for individual conditions or treatments. Talk to your doctor, nurse or pharmacist before following any medical regimen to see if it is safe and effective for you. The discharge information has been reviewed with the patient and spouse. The patient and spouse verbalized understanding. Discharge medications reviewed with the patient and spouse and appropriate educational materials and side effects teaching were provided. Discharge Orders None Be Spotted Announcement We are excited to announce that we are making your provider's discharge notes available to you in Be Spotted. You will see these notes when they are completed and signed by the physician that discharged you from your recent hospital stay. If you have any questions or concerns about any information you see in Be Spotted, please call the Health Information Department where you were seen or reach out to your Primary Care Provider for more information about your plan of care. Introducing [facility]! Kayley Rodgers introduces Be Spotted patient portal. Now you can access parts of your medical record, email your doctor's office, and request medication refills online. 1. In your internet browser, go to [email or web address] 2. Click on the First Time User? Click Here link in the Sign In box. You will see the New Member Sign Up page. 3. Enter your Be Spotted Access Code exactly as it appears below.  You will not need to use this code after youve completed the sign-up process. If you do not sign up before the expiration date, you must request a new code. · Sandag Access Code: [email or web address] Expires: 4/12/2017  9:48 AM 
 
4. Enter the last four digits of your Social Security Number (xxxx) and Date of Birth (mm/dd/yyyy) as indicated and click Submit. You will be taken to the next sign-up page. 5. Create a Sandag ID. This will be your Sandag login ID and cannot be changed, so think of one that is secure and easy to remember. 6. Create a Sandag password. You can change your password at any time. 7. Enter your Password Reset Question and Answer. This can be used at a later time if you forget your password. 8. Enter your e-mail address. You will receive e-mail notification when new information is available in [address] 9. Click Sign Up. You can now view and download portions of your medical record. 10. Click the Download Summary menu link to download a portable copy of your medical information. If you have questions, please visit the Frequently Asked Questions section of the Sandag website. Remember, Sandag is NOT to be used for urgent needs. For medical emergencies, dial 911. Now available from your iPhone and Android! General Information Please provide this summary of care documentation to your next provider. Patient Signature:  ____________________________________________________________ Date:  ____________________________________________________________  
  
Aletha Sexton Provider Signature:  ____________________________________________________________ Date:  ____________________________________________________________

## 2017-01-13 NOTE — H&P
Marcus Lai 66 y.o. male      Mr. Jose Fairchild seen today for symptomatic BPH with history of chronic prostatitis treated with periodic 3 week courses of Cipro antibiotic therapy-now on alpha-blocker treatment with Flomax  which has not alleviated irritative voiding symptoms-here today for PVR assessment of voiding efficiency-cystoscopy assessing irritative voiding symptoms in June 2016 showed unobstructed prostate channel with normal urothelium of the bladder and urethra     Cystoscopy 28 June 2016 showed normal urothelium with unobstructed prostate channel     PSA 0.8 in May 2016        Irritative voiding symptoms progressively worsening over the past 6 months  Patient complains of dysuria with daytime frequency Q2 hours and nocturia 4-5 times per night-symptoms not alleviated significantly by alpha-blocker treatment Flomax   no gross hematuria-No history of fever flank pain or pain in the perineum      Patient has extensive history of perirectal abscess since 2014 requiring numerous drainage procedures under general anesthesia-currently managed by maintenance indwelling rubber drain      Review of Systems:    CNS: No seizure syncope headaches or dizziness no visual changes  Respiratory: No wheezing or shortness of breath  Cardiovascular:Coronary artery disease status post CABG 2014/Hypertension  Intestinal:No dyspepsia diarrhea or constipation  Urinary:Irritative voiding symptoms    Skeletal: No bone or joint pain  Endocrine:Diabetes  Other:        Allergies:         Allergies   Allergen Reactions    Retaine Cmc [Carboxymethylcellulose Sodium] Other (comments)      Medications:          Current Outpatient Prescriptions   Medication Sig Dispense Refill    multivitamin (ONE A DAY) tablet Take 1 Tab by mouth daily.        acetaminophen (TYLENOL) 325 mg tablet Take by mouth every four (4) hours as needed for Pain.        furosemide (LASIX) 40 mg tablet          SPIRIVA WITH HANDIHALER 18 mcg inhalation capsule Take 1 Cap by inhalation daily.        tamsulosin (FLOMAX) 0.4 mg capsule Take 0.4 mg by mouth daily.        docusate sodium (COLACE) 100 mg capsule Take 100 mg by mouth two (2) times a day.        omeprazole (PRILOSEC) 20 mg capsule Take 20 mg by mouth daily.        ramipril (ALTACE) 2.5 mg capsule Take 2.5 mg by mouth daily.        carvedilol (COREG) 6.25 mg tablet Take 6.25 mg by mouth two (2) times daily (with meals).        simvastatin (ZOCOR) 20 mg tablet Take 20 mg by mouth nightly.        metFORMIN (GLUCOPHAGE) 1,000 mg tablet Take 1,000 mg by mouth two (2) times daily (with meals).        oxyCODONE-acetaminophen (PERCOCET 10)  mg per tablet Take 1 Tab by mouth every eight (8) hours as needed for Pain. Max Daily Amount: 3 Tabs. 90 Tab 0    PROAIR HFA 90 mcg/actuation inhaler Take 1 Puff by inhalation.        amitriptyline (ELAVIL) 50 mg tablet TAKE ONE TABLET BY MOUTH ONCE DAILY AT NIGHT 30 Tab 0    ciprofloxacin HCl (CIPRO) 500 mg tablet          rOPINIRole (REQUIP) 1 mg tablet Take 1 mg by mouth.        HYDROmorphone (DILAUDID) 4 mg tablet Take 1 Tab by mouth every six (6) hours as needed for Pain. Max Daily Amount: 16 mg. 120 Tab 0    rOPINIRole (REQUIP) 0.25 mg tablet          oxyCODONE-acetaminophen (PERCOCET)  mg per tablet Take one tablet by mouth every 4-6 hours as needed for pain.  150 Tab 0    methylPREDNISolone (MEDROL DOSEPACK) 4 mg tablet Per dose pack instructions 1 Package 0    aspirin delayed-release 81 mg tablet Take 81 mg by mouth daily.        furosemide (LASIX) 80 mg tablet Take by mouth daily.                    Past Medical History   Diagnosis Date    Adverse effect of anesthesia         Will discuss on admit    Anal fistula and Anal tag      CAD (coronary artery disease)      Diabetes (HCC)      Emphysema      Follow-up examination following surgery      Unspecified adverse effect of anesthesia         Can not take Ketamine    Unspecified sleep apnea         no cpap             Past Surgical History   Procedure Laterality Date    Hx back surgery   2001    Hx other surgical   2011       \"perirectal\"    Hx hernia repair   2009    Hx gi           Excision of anal tag    Hx gi           Modified Harrison procedure    Pr cardiac surg procedure unlist   1/2014       cabgx2 repaired valve    Hx lumbar laminectomy        Hx lumbar fusion                Family History   Problem Relation Age of Onset    No Known Problems Mother      No Known Problems Father           Physical Examination: Well-nourished mature male in no apparent distress  Normal prostate by DEDRA in May 2016     Urinalysis: No specimen today     PVR today 76 cc     Impression: Chronic prostatitis   -Symptomatic BPH responding favorably to alpha-blocker treatment with Flomax -           Plan: Flomax 0.4 mg daily  TURP      Counseling Note:  Indications for an technique of TURP have been described and discussed-patient understands and accepts the risks of bleeding, infection, obstruction, persistence of irritative voiding symptoms as well as retrograde ejaculation with consequent infertility-patient's irritative voiding symptoms have not  responded favorably to medical management-TURP may not alleviate irritative symptoms but if prostate microabscesses are responsible for patient's symptoms drainage by TURP should provide significant relief     rtc 1 week postop TURP     Too Mario MD

## 2017-01-13 NOTE — BRIEF OP NOTE
BRIEF OPERATIVE NOTE    Date of Procedure: 1/13/2017   Preoperative Diagnosis: Benign prostatic hypertrophy with urinary frequency [N40.1, R35.0]  Postoperative Diagnosis: Benign prostatic hypertrophy with urinary frequency [N40.1, R35.0]    Procedure(s):  CYSTOSCOPY TRANSURETHRAL RESECTION OF PROSTATE  Surgeon(s) and Role:      Cherylynn Dance, MD - Primary  Surgical Staff:  Circ-1: Georgina Lala RN  Circ-Relief: Veena Skelton RN  Scrub Tech-1: Minta Schirmer  Event Time In   Incision Start 1106   Incision Close 1139     Anesthesia: General   Estimated Blood Loss: 25 cc  Specimens:   ID Type Source Tests Collected by Time Destination   1 : Prostate Tissue Preservative Prostate  Cherylynn Dance, MD 1/13/2017 1129 Pathology      Findings: BPH  Complications: none  Implants: none    20 Fr Abreu to leg bag    Cherylynn Dance, MD

## 2017-01-13 NOTE — DISCHARGE INSTRUCTIONS
Light activity  Reg diet  Pérez to leg bag  Rx Cipro, Colace, Percocet    Return to office in 3 days for pérez removal    Cherylynn Dance, MD      DISCHARGE SUMMARY from Nurse    The following personal items are in your possession at time of discharge:    Dental Appliances: Uppers  Visual Aid: None     Home Medications: None  Jewelry: None  Clothing: Jacket/Coat, Pants, Shirt, Undergarments, Socks, Footwear  Other Valuables: None             PATIENT INSTRUCTIONS:    After general anesthesia or intravenous sedation, for 24 hours or while taking prescription Narcotics:  · Limit your activities  · Do not drive and operate hazardous machinery  · Do not make important personal or business decisions  · Do  not drink alcoholic beverages  · If you have not urinated within 8 hours after discharge, please contact your surgeon on call. Report the following to your surgeon:  · Excessive pain, swelling, redness or odor of or around the surgical area  · Temperature over 100.5  · Nausea and vomiting lasting longer than 4 hours or if unable to take medications  · Any signs of decreased circulation or nerve impairment to extremity: change in color, persistent  numbness, tingling, coldness or increase pain  · Any questions        What to do at Home:  Recommended activity: Activity as tolerated and no driving for today. *  Please give a list of your current medications to your Primary Care Provider. *  Please update this list whenever your medications are discontinued, doses are      changed, or new medications (including over-the-counter products) are added. *  Please carry medication information at all times in case of emergency situations. These are general instructions for a healthy lifestyle:    No smoking/ No tobacco products/ Avoid exposure to second hand smoke    Surgeon General's Warning:  Quitting smoking now greatly reduces serious risk to your health.     Obesity, smoking, and sedentary lifestyle greatly increases your risk for illness    A healthy diet, regular physical exercise & weight monitoring are important for maintaining a healthy lifestyle    You may be retaining fluid if you have a history of heart failure or if you experience any of the following symptoms:  Weight gain of 3 pounds or more overnight or 5 pounds in a week, increased swelling in our hands or feet or shortness of breath while lying flat in bed. Please call your doctor as soon as you notice any of these symptoms; do not wait until your next office visit. Recognize signs and symptoms of STROKE:    F-face looks uneven    A-arms unable to move or move unevenly    S-speech slurred or non-existent    T-time-call 911 as soon as signs and symptoms begin-DO NOT go       Back to bed or wait to see if you get better-TIME IS BRAIN. Warning Signs of HEART ATTACK     Call 911 if you have these symptoms:   Chest discomfort. Most heart attacks involve discomfort in the center of the chest that lasts more than a few minutes, or that goes away and comes back. It can feel like uncomfortable pressure, squeezing, fullness, or pain.  Discomfort in other areas of the upper body. Symptoms can include pain or discomfort in one or both arms, the back, neck, jaw, or stomach.  Shortness of breath with or without chest discomfort.  Other signs may include breaking out in a cold sweat, nausea, or lightheadedness. Don't wait more than five minutes to call 911 - MINUTES MATTER! Fast action can save your life. Calling 911 is almost always the fastest way to get lifesaving treatment. Emergency Medical Services staff can begin treatment when they arrive -- up to an hour sooner than if someone gets to the hospital by car. Transurethral Resection of the Prostate (TURP): What to Expect at [facility]    Transurethral resection of the prostate (TURP) is surgery to remove a section of the prostate gland.  This is done when the prostate gland has grown too large. The prostate gland is a walnut-sized organ in men that grows around the urethra. The urethra is the tube that carries urine from the bladder, through the penis, to outside the body. The prostate gland produces most of the fluid in semen. You may need a urinary catheter for a short time. A urinary catheter is a flexible plastic tube used to drain urine from your bladder when you cannot urinate on your own. If it is still in place when you go home, your doctor will give you instructions on how to care for your catheter. For several days after surgery, you may feel burning when you urinate. Your urine may be pink for 1 to 3 weeks after surgery. You also may have bladder cramps, or spasms. Your doctor may give you medicine to help control the spasms. You may still feel like you need to urinate often in the weeks after your surgery. It often takes up to 6 weeks for this to get better. Once you have healed, you may have less trouble urinating. You may have better control over starting and stopping your urine stream and feel like you get more relief when you urinate. Most men can return to work or many of their usual activities in 1 to 3 weeks. But for about 6 weeks, try to avoid heavy lifting and strenuous activities that might put extra pressure on your bladder. Most men still can have erections after surgery (if they were able to have them before surgery), but they may not ejaculate when they have an orgasm. Semen may go into the bladder instead of out through the penis. This is called retrograde ejaculation. This does not hurt and is not harmful to your health. But it may mean that you will not be able to father a child. If this is a concern, talk to your doctor about saving your sperm before the surgery. This care sheet gives you a general idea about how long it will take for you to recover. But each person recovers at a different pace. Follow the steps below to get better as quickly as possible.   How can you care for yourself at home? Activity  · Rest when you feel tired. Getting enough sleep will help you recover. · Try to walk each day. Start by walking a little more than you did the day before. Bit by bit, increase the amount you walk. Walking boosts blood flow and helps prevent pneumonia and constipation. · Avoid strenuous activities for 6 weeks after surgery, or until your doctor says it is okay. This includes bicycle riding, jogging, weight lifting, or aerobic exercise. · For 6 weeks, avoid lifting anything that would make you strain. This may include a child, heavy grocery bags and milk containers, a heavy briefcase or backpack, cat litter or dog food bags, or a vacuum . · Ask your doctor when you can drive again. · You will probably need to take 1 to 3 weeks off from work. It depends on the type of work you do and how you feel. · Do not put anything in your rectum, such as an enema or suppository, for 4 to 6 weeks after the surgery. · You may shower and take baths when your doctor says it is okay. · Ask your doctor when it is okay for you to have sex. Diet  · You can eat your normal diet. If your stomach is upset, try bland, low-fat foods like plain rice, broiled chicken, toast, and yogurt. · Drink plenty of fluids (unless your doctor tells you not to). · You may notice that your bowel movements are not regular right after your surgery. This is common. Try to avoid constipation and straining with bowel movements. You may want to take a fiber supplement every day. If you have not had a bowel movement after a couple of days, ask your doctor about taking a mild laxative. Medicines  · Your doctor will tell you if and when you can restart your medicines. He or she will also give you instructions about taking any new medicines. · If you take blood thinners, such as warfarin (Coumadin), clopidogrel (Plavix), or aspirin, be sure to talk to your doctor.  He or she will tell you if and when to start taking those medicines again. Make sure that you understand exactly what your doctor wants you to do. · Be safe with medicines. Take pain medicines exactly as directed. ¨ If the doctor gave you a prescription medicine for pain, take it as prescribed. ¨ If you are not taking a prescription pain medicine, ask your doctor if you can take an over-the-counter medicine. · If you think your pain medicine is making you sick to your stomach:  ¨ Take your medicine after meals (unless your doctor has told you not to). ¨ Ask your doctor for a different pain medicine. · If your doctor prescribed antibiotics, take them as directed. Do not stop taking them just because you feel better. You need to take the full course of antibiotics. Follow-up care is a key part of your treatment and safety. Be sure to make and go to all appointments, and call your doctor if you are having problems. It's also a good idea to know your test results and keep a list of the medicines you take. When should you call for help? Call 911 anytime you think you may need emergency care. For example, call if:  · You passed out (lost consciousness). · You have severe trouble breathing. · You have sudden chest pain and shortness of breath, or you cough up blood. Call your doctor now or seek immediate medical care if:  · You cannot urinate. · You are leaking or dripping urine. · You have pain that does not get better after you take pain medicine. · You are sick to your stomach or cannot keep fluids down. · You have pain in your back just below your rib cage. This is called flank pain. · You have a fever, chills, or body aches. · You have signs of a blood clot, such as:  ¨ Pain in your calf, back of the knee, thigh, or groin. ¨ Redness and swelling in your leg or groin. Watch closely for changes in your health, and be sure to contact your doctor if:  · You do not have a bowel movement after taking a laxative. Where can you learn more?   Go to http://azalia-chloé.info/. Enter S624 in the search box to learn more about \"Transurethral Resection of the Prostate (TURP): What to Expect at Home. \"  Current as of: May 24, 2016  Content Version: 11.1  © 7564-8273 [employer]. Care instructions adapted under license by [employer] (which disclaims liability or warranty for this information). If you have questions about a medical condition or this instruction, always ask your healthcare professional. [address] any warranty or liability for your use of this information. Oxycodone/Acetaminophen (By mouth)   Acetaminophen (y-wlsb-g-MIN-oh-fen), Oxycodone Hydrochloride (rd-u-GAZ-done eric-droe-KLOR-hong)  Treats moderate to moderately severe pain. This medicine is a narcotic pain reliever. Brand Name(s):Endocet, Percocet, Primlev, Roxicet, Xartemis XR   There may be other brand names for this medicine. When This Medicine Should Not Be Used: This medicine is not right for everyone. Do not use it if you had an allergic reaction to acetaminophen or oxycodone, or if you have serious breathing problems (such as severe asthma, hypercarbia, respiratory depression) or paralytic ileus. How to Use This Medicine:   Capsule, Liquid, Tablet, Long Acting Tablet  · Your doctor will tell you how much medicine to use. Do not use more than directed. · Measure the oral liquid medicine with a marked measuring spoon, oral syringe, or medicine cup. · Swallow the extended-release tablet whole. Do not crush, break, or chew it. Do not lick or wet the tablet before placing it in your mouth. Do not give this medicine through a feeding tube. · This medicine should come with a Medication Guide. Ask your pharmacist for a copy if you do not have one. · Store the medicine in a closed container at room temperature, away from heat, moisture, and direct light.  Ask your pharmacist about the best way to dispose of medicine you do not use. Drugs and Foods to Avoid:   Ask your doctor or pharmacist before using any other medicine, including over-the-counter medicines, vitamins, and herbal products. · Do not use Xartemis XR if you have used an MAO inhibitor in the past 14 days. · Some medicines and foods can affect how this medicine works. Tell your doctor if you are taking any of the following:   ¨ Butorphanol, lamotrigine, nalbuphine, naltrexone, pentazocine, propranolol, zidovudine  ¨ Birth control pills, a diuretic (water pill), muscle relaxants, a phenothiazine medicine (chlorpromazine, perphenazine, promethazine, prochlorperazine, thioridazine)  · Do not drink alcohol while you are using this medicine. Acetaminophen can damage your liver, and alcohol can increase this risk. Do not take acetaminophen without asking your doctor if you have 3 or more drinks of alcohol every day. · Tell your doctor if you are using any medicine that makes you sleepy, such as allergy medicine or other narcotic pain medicine. Warnings While Using This Medicine:   · Tell your doctor if you are pregnant, or if you have kidney disease, liver disease, heart disease, low blood pressure, breathing problems or lung disease, especially if you have asthma, COPD, cor pulmonale, or kyphoscoliosis (curved spine that causes breathing trouble). Tell your doctor if you have urination problems, an underactive thyroid, [place] disease, pancreas or prostate problems, or a stomach disorder. Tell your doctor if you have a history of head injury or brain damage, seizures, or alcohol or drug abuse. Tell your doctor if you are allergic to codeine. · Do not breastfeed while you are using this medicine, unless otherwise directed by your doctor. · This medicine may cause the following problems:  ¨ Liver problems  ¨ Serious skin reactions  ¨ Low blood pressure  · If you take this medicine for more than a few weeks, do not stop taking it suddenly.  Your doctor will need to slowly decrease your dose before you stop it completely. · Get emergency help immediately if you think you may have taken too much of this medicine. Signs of an overdose include shallow breathing, fainting, confusion, nausea, vomiting, pinpoint pupils of the eyes, or pale or blue lips, fingernails, or skin. · This medicine may make you dizzy or drowsy. Do not drive or do anything that could be dangerous until you know how this medicine affects you. · This medicine contains acetaminophen. Read the labels of all other medicines you are using to see if they also contain acetaminophen, or ask your doctor or pharmacist. Zoila Pun not use more than 4 grams (4,000 milligrams) total of acetaminophen in one day. · This medicine can be habit-forming. Do not use more than your prescribed dose. Call your doctor if you think your medicine is not working. · This medicine may cause constipation, especially with long-term use. Ask your doctor if you should use a laxative to prevent and treat constipation. · Keep all medicine out of the reach of children. Never share your medicine with anyone. Possible Side Effects While Using This Medicine:   Call your doctor right away if you notice any of these side effects:  · Allergic reaction: Itching or hives, swelling in your face or hands, swelling or tingling in your mouth or throat, chest tightness, trouble breathing  · Dark urine or pale stools, nausea, vomiting, loss of appetite, stomach pain, yellow skin or eyes  · Extreme weakness, shallow breathing, uneven heartbeat, seizures, sweating, or cold or clammy skin  · Lightheadedness, dizziness, or fainting  · Trouble breathing  If you notice these less serious side effects, talk with your doctor:   · Constipation  · Headache  · Mild lightheadedness, sleepiness, or drowsiness  · Mild nausea or vomiting  If you notice other side effects that you think are caused by this medicine, tell your doctor.    Call your doctor for medical advice about side effects. You may report side effects to FDA at 6-445-FDA-5589  © 2016 [address] is for End User's use only and may not be sold, redistributed or otherwise used for commercial purposes. The above information is an  only. It is not intended as medical advice for individual conditions or treatments. Talk to your doctor, nurse or pharmacist before following any medical regimen to see if it is safe and effective for you. Ciprofloxacin (By mouth)   Ciprofloxacin (uif-xzy-KUJT-a-sin)  Treats infections. This medicine is a quinolone antibiotic. Brand Name(s):Cipro   There may be other brand names for this medicine. When This Medicine Should Not Be Used: This medicine is not right for everyone. Do not use if you had an allergic reaction to ciprofloxacin or similar medicines. How to Use This Medicine:   Liquid, Tablet, Long Acting Tablet  · Take this medicine as directed, and take it at the same time each day. · You may take this medicine with or without food. Do not take this medicine with only a source of calcium, such as milk, yogurt, or juice that contains added calcium. You may have foods or drinks that contain calcium as part of a larger meal.  · Swallow the tablet whole. Do not break, crush, or chew it. · Oral liquid: Shake for at least 15 seconds just before each use. The liquid has small beads floating in it. Do not chew the beads when you drink the liquid. Measure the oral liquid medicine with a marked measuring spoon, oral syringe, or medicine cup. · Drink extra fluids so you will urinate more often and help prevent kidney problems. · Take all of the medicine in your prescription to clear up your infection, even if you feel better after the first few doses. · This medicine should come with a Medication Guide. Ask your pharmacist for a copy if you do not have one. · Missed dose: Take a dose as soon as you remember.  If it is almost time for your next dose, wait until then and take a regular dose. Do not take extra medicine to make up for a missed dose. · Store the medicine in a closed container at room temperature, away from heat, moisture, and direct light. Throw away any leftover liquid medicine after 14 days. Drugs and Foods to Avoid:   Ask your doctor or pharmacist before using any other medicine, including over-the-counter medicines, vitamins, and herbal products. · Do not use this medicine together with tizanidine. · Some medicines and foods can affect how ciprofloxacin works. Tell your doctor if you are using theophylline or a steroid medicine (such as hydrocortisone, methylprednisolone, prednisone). · Tell your doctor if you are using clozapine, cyclosporine, duloxetine, lidocaine, methotrexate, olanzapine, pentoxifylline, phenytoin, probenecid, ropinirole, sildenafil, a blood thinner (such as warfarin), a diabetes medicine (such as glyburide), medicine for depression, medicine for mental illness, other medicine to treat an infection, NSAID pain medicine (such as aspirin, diclofenac, ibuprofen, naproxen, celecoxib), or medicine for heart rhythm problems (such as quinidine, procainamide, amiodarone, sotalol). · Some minerals and medicines can keep your body from absorbing this medicine. You may need to take ciprofloxacin at least 2 hours before or 6 hours after you take these medicines. These include magnesium, aluminum, calcium, zinc, iron, lanthanum, sevelamer, sucralfate, and didanosine. Ask your pharmacist for more information. · This medicine slows the digestion of caffeine, so it might affect you for longer than normal.  Warnings While Using This Medicine:   · Tell your doctor if you are pregnant or breastfeeding, or if you have kidney disease, liver disease, diabetes, heart disease, myasthenia gravis, or a history of heart rhythm problems (such as prolonged QT interval), seizures, or stroke.  Tell your doctor if you have ever had tendon or joint problems, including rheumatoid arthritis, or if you have received a transplant. · This medicine may cause the following problems:  ¨ Tendinitis and tendon rupture (may happen after treatment ends)  ¨ Liver damage  ¨ Nerve damage in the arms or legs  ¨ Severe diarrhea  ¨ Heart rhythm changes  · This medicine may make you dizzy, drowsy, or lightheaded. Do not drive or do anything that could be dangerous until you know how this medicine affects you. · This medicine can cause diarrhea. Call your doctor if the diarrhea becomes severe, does not stop, or is bloody. Do not take any medicine to stop diarrhea until you have talked to your doctor. Diarrhea can occur 2 months or more after you stop taking this medicine. · This medicine may make your skin more sensitive to sunlight. Wear sunscreen. Do not use sunlamps or tanning beds. · Call your doctor if your symptoms do not improve or if they get worse. · Keep all medicine out of the reach of children. Never share your medicine with anyone. Possible Side Effects While Using This Medicine:   Call your doctor right away if you notice any of these side effects:  · Allergic reaction: Itching or hives, swelling in your face or hands, swelling or tingling in your mouth or throat, chest tightness, trouble breathing  · Blistering, peeling, or red skin rash  · Diarrhea that may contain blood  · Fainting, dizziness, or lightheadedness  · Fast, slow, or uneven heartbeat  · Numbness, tingling, weakness, or burning pain in your hands, arms, legs, or feet  · Pain, stiffness, swelling, or bruises around your ankle, leg, shoulder, or other joint  · Seizures, severe headache, unusual thoughts or behaviors, trouble sleeping, confusion  · Unusual bleeding, bruising, or weakness  · Yellow skin or eyes  If you notice other side effects that you think are caused by this medicine, tell your doctor. Call your doctor for medical advice about side effects.  You may report side effects to FDA at 1-800-FDA-1088  © 2016 [address] is for End User's use only and may not be sold, redistributed or otherwise used for commercial purposes. The above information is an  only. It is not intended as medical advice for individual conditions or treatments. Talk to your doctor, nurse or pharmacist before following any medical regimen to see if it is safe and effective for you. Laxative, Stimulant Combination (By mouth)   Treats constipation by helping you have a bowel movement. Brand Name(s):DR. Saldaña's Olive Laxative, Doc-Q-Lax, Doculax Plus, Dok Plus, Doxidan, Femintrol, Good [facility] Stool Softener & Laxative, Laxa-Basic Plus, Laxacin, Medi-Laxx, Perdiem, Aziza-Colace, [employer] Laxative and Stool Softener, [employer] P Col-Rite, Senexon-S   There may be other brand names for this medicine. When This Medicine Should Not Be Used: You should not use this medicine if you have had an allergic reaction to senna, sennosides, docusate, casanthranol, or psyllium. Some brand names for these medicines are Correctol® stool softener, Ex-Lax®, Colace®, or Metamucil®. Make sure your doctor knows if you are allergic to any other laxative medicines. How to Use This Medicine:   Tablet, Liquid Filled Capsule, Liquid, Granule, Capsule, Powder for Suspension  · Your doctor will tell you how much medicine to use. Do not use more than directed. · If you have had a sudden change in your bowel movements in the past two weeks, ask your doctor before using this medicine. · Follow the instructions on the medicine label if you are using this medicine without a prescription. · Drink a full glass of water when you take this medicine. One full glass of water is about 8 ounces or 1 cup. Drinking 6 to 8 full glasses of water every day will help keep your bowel movements soft. · You might need to mix the granules or powder with water before you take each dose.  Drink this mixture right away. Do not swallow the granules or powder dry unless the directions say you can. · Measure the oral liquid medicine with a marked measuring spoon, oral syringe, or medicine cup. · Use this medicine at bedtime, unless your doctor tells you otherwise. If a dose is missed:   · Take a dose as soon as you remember. If it is almost time for your next dose, wait until then and take a regular dose. Do not take extra medicine to make up for a missed dose. How to Store and Dispose of This Medicine:   · Store the medicine in a closed container at room temperature, away from heat, moisture, and direct light. · Ask your pharmacist, doctor, or health caregiver about the best way to dispose of any outdated medicine or medicine no longer needed. · Keep all medicine out of the reach of children. Never share your medicine with anyone. Drugs and Foods to Avoid:   Ask your doctor or pharmacist before using any other medicine, including over-the-counter medicines, vitamins, and herbal products. · Make sure your doctor knows if you are also using mineral oil. · Some laxatives need to be taken 2 hours before or 2 hours after other medicines. If you need to take any other medicine, ask your health caregiver if you need to follow a special schedule. Warnings While Using This Medicine:   · Make sure your doctor knows if you are pregnant or breast feeding. · Do not use this medicine if you have stomach pain, nausea, or vomiting, unless your health caregiver tells you to use it. · If you do not have a bowel movement after using this medicine, talk to your doctor. Most people will have a bowel movement within 6 to 12 hours after using this laxative. · You should not use this laxative for more than 1 week unless your doctor says it is okay. Laxatives may be habit-forming and can harm your bowels if you use them too long.   Possible Side Effects While Using This Medicine:   Call your doctor right away if you notice any of these side effects:  · Bleeding from your rectum. · Skin rash. · Urine turns a different color. If you notice these less serious side effects, talk with your doctor:   · Diarrhea, cramps, nausea, burping. If you notice other side effects that you think are caused by this medicine, tell your doctor. Call your doctor for medical advice about side effects. You may report side effects to FDA at 9-897-FAY-2983  © 2016 [address] is for End User's use only and may not be sold, redistributed or otherwise used for commercial purposes. The above information is an  only. It is not intended as medical advice for individual conditions or treatments. Talk to your doctor, nurse or pharmacist before following any medical regimen to see if it is safe and effective for you. The discharge information has been reviewed with the patient and spouse. The patient and spouse verbalized understanding. Discharge medications reviewed with the patient and spouse and appropriate educational materials and side effects teaching were provided.

## 2017-01-13 NOTE — ANESTHESIA PREPROCEDURE EVALUATION
Anesthetic History               Review of Systems / Medical History  Patient summary reviewed, nursing notes reviewed and pertinent labs reviewed    Pulmonary    COPD: moderate    Sleep apnea: CPAP  Smoker         Neuro/Psych   Within defined limits           Cardiovascular              Past MI and CAD    Exercise tolerance: >4 METS     GI/Hepatic/Renal  Within defined limits              Endo/Other    Diabetes: well controlled, type 2         Other Findings   Comments: Current Smoker? YES       Elective Surgery? Yes       Abstained from smoking 24 hours prior to anesthesia? NO    Risk Factors for Postoperative nausea/vomiting:       History of postoperative nausea/vomiting? NO       Female? NO       Motion sickness? NO       Intended opioid administration for postoperative analgesia?   NO         Physical Exam    Airway  Mallampati: III  TM Distance: 4 - 6 cm  Neck ROM: decreased range of motion   Mouth opening: Diminished (comment)     Cardiovascular  Regular rate and rhythm,  S1 and S2 normal,  no murmur, click, rub, or gallop             Dental    Dentition: Poor dentition and Full upper dentures     Pulmonary  Breath sounds clear to auscultation               Abdominal  GI exam deferred       Other Findings            Anesthetic Plan    ASA: 3  Anesthesia type: general and MAC      Post-op pain plan if not by surgeon: peripheral nerve block single    Induction: Intravenous  Anesthetic plan and risks discussed with: Patient and Family

## 2017-01-13 NOTE — ANESTHESIA POSTPROCEDURE EVALUATION
Post-Anesthesia Evaluation and Assessment    Patient: Sondra De La O MRN: [id number]  SSN: [id number]    YOB: 1945  Age: 66 y.o. Sex: male       Cardiovascular Function/Vital Signs  Visit Vitals    /57    Pulse 74    Temp 36.8 °C (98.2 °F)    Resp 15    Ht 5' 11\" (1.803 m)    Wt 115.2 kg (254 lb)    SpO2 100%    BMI 35.43 kg/m2       Patient is status post general anesthesia for Procedure(s):  CYSTOSCOPY TRANSURETHRAL RESECTION OF PROSTATE. Nausea/Vomiting: None    Postoperative hydration reviewed and adequate. Pain:  Pain Scale 1: Numeric (0 - 10) (01/13/17 1150)  Pain Intensity 1: 0 (01/13/17 1150)   Managed    Neurological Status:   Neuro (WDL): Exceptions to WDL (01/13/17 1139)  Neuro  Neurologic State: Drowsy (01/13/17 1139)   At baseline    Mental Status and Level of Consciousness: Arousable    Pulmonary Status:   O2 Device: Oral airway (01/13/17 1146)   Adequate oxygenation and airway patent    Complications related to anesthesia: None    Post-anesthesia assessment completed.  No concerns    Signed By: Magui Diego MD     January 13, 2017

## 2017-01-14 NOTE — OP NOTES
[address]    Name:  Kary Bejarano  MR#:  [id number]  :  1945  Account #:  [de-identified]  Date of Adm:  2017  Date of Surgery:  2017      PREOPERATIVE DIAGNOSIS: Symptomatic benign prostatic  hyperplasia, unresponsive to alpha-blocker therapy. POSTOPERATIVE DIAGNOSES: Symptomatic benign prostatic  hyperplasia, unresponsive to alpha-blocker therapy. PROCEDURES PERFORMED: Transurethral resection of the  prostate. SPECIMENS REMOVED: TURP chips. ANESTHESIA: General by LMA. ESTIMATED BLOOD LOSS: Minimal.    TUBES AND DRAINS: A 20-Citizen of the [place] Abreu catheter to leg bag. INDICATIONS FOR OPERATION: A 66-year-old gentleman who has  progressively worsening irritative voiding symptoms, initially  responding favorably to treatment by alpha-blocker therapy and  anticholinergic medication, now no longer obtaining relief from alpha-  blocker, anticholinergic, and beta-3 alpha agonist medication. He is  now admitted for transurethral resection of the prostate to help  alleviate intolerable, irritative voiding symptoms. DESCRIPTION OF OPERATION: After establishing adequate general  anesthesia by LMA, the patient was placed in the dorsal lithotomy  position with legs suspended in Morgan stirrups, cushioned with soft foam  rubber padding. The external genitalia were then prepped with  antibiotic scrub and solution, and draped in a sterile manner. A 27-  Citizen of [place] continuous-flow resectoscope was passed under direct vision  with visual obturator in place and the urethral lumen showed several  whitish concentric rings along the entire length of the pendulous  urethra. The urethra was snug on the 26-Citizen of the [place] resectoscope, but the  resectoscope was successfully passed into the bladder, revealing an  enlarged median lobe protruding intravesically. The lateral lobes were  not coapting in the midline.  There was mild trabeculation, but no  stones or tumors were evident. There was marked injection of blood  vessels in the bladder outlet and prostatic urethra. No inflammatory  changes were evident. The ureteral orifices were visualized directly,  both were normal in appearance and clear effluxes were observed  from both sides. The median lobe of the prostate was then resected, removing  approximately 4 g of tissue. The lateral lobes were vaporized with the  button electrode and the resected bed of the median lobe and anterior  lobe were cauterized with the button electrode. Very little bleeding was  encountered during the resection, and what bleeding was observed  was easily controlled with the button cautery. The bladder was then  irrigated with saline solution and then a [place] evacuator removing the  TURP chips, which were submitted to the laboratory for histologic  study. The bladder was filled to capacity with saline solution and the  resectoscope was removed. Application of suprapubic pressure  produced a forceful urinary stream, which subsided on withdrawal of  suprapubic pressure. A 20-[place] Abreu catheter was then passed into  the bladder, balloon inflated to 10 mL volume, placed to gravity bag  drainage. A clear efflux was observed from the bladder at this time. The procedure overall was uncomplicated and well tolerated by the  patient. He was taken from the operating room to the recovery room in  good condition.         MD ERICK Anderson / Delicia Kirkpatrick  D:  01/13/2017   13:20  T:  01/13/2017   19:40  Job #:  [id number]

## 2017-01-16 ENCOUNTER — OFFICE VISIT (OUTPATIENT)
Dept: UROLOGY | Age: 72
End: 2017-01-16

## 2017-01-16 VITALS
TEMPERATURE: 98.1 F | HEIGHT: 71 IN | SYSTOLIC BLOOD PRESSURE: 120 MMHG | DIASTOLIC BLOOD PRESSURE: 60 MMHG | OXYGEN SATURATION: 93 % | HEART RATE: 86 BPM

## 2017-01-16 DIAGNOSIS — N40.0 BENIGN NON-NODULAR PROSTATIC HYPERPLASIA WITHOUT LOWER URINARY TRACT SYMPTOMS: Primary | ICD-10-CM

## 2017-01-16 NOTE — PATIENT INSTRUCTIONS
Transurethral Resection of the Prostate (TURP): What to Expect at [facility]    Transurethral resection of the prostate (TURP) is surgery to remove a section of the prostate gland. This is done when the prostate gland has grown too large. The prostate gland is a walnut-sized organ in men that grows around the urethra. The urethra is the tube that carries urine from the bladder, through the penis, to outside the body. The prostate gland produces most of the fluid in semen. You may need a urinary catheter for a short time. A urinary catheter is a flexible plastic tube used to drain urine from your bladder when you cannot urinate on your own. If it is still in place when you go home, your doctor will give you instructions on how to care for your catheter. For several days after surgery, you may feel burning when you urinate. Your urine may be pink for 1 to 3 weeks after surgery. You also may have bladder cramps, or spasms. Your doctor may give you medicine to help control the spasms. You may still feel like you need to urinate often in the weeks after your surgery. It often takes up to 6 weeks for this to get better. Once you have healed, you may have less trouble urinating. You may have better control over starting and stopping your urine stream and feel like you get more relief when you urinate. Most men can return to work or many of their usual activities in 1 to 3 weeks. But for about 6 weeks, try to avoid heavy lifting and strenuous activities that might put extra pressure on your bladder. Most men still can have erections after surgery (if they were able to have them before surgery), but they may not ejaculate when they have an orgasm. Semen may go into the bladder instead of out through the penis. This is called retrograde ejaculation. This does not hurt and is not harmful to your health. But it may mean that you will not be able to father a child.  If this is a concern, talk to your doctor about saving your sperm before the surgery. This care sheet gives you a general idea about how long it will take for you to recover. But each person recovers at a different pace. Follow the steps below to get better as quickly as possible. How can you care for yourself at home? Activity  · Rest when you feel tired. Getting enough sleep will help you recover. · Try to walk each day. Start by walking a little more than you did the day before. Bit by bit, increase the amount you walk. Walking boosts blood flow and helps prevent pneumonia and constipation. · Avoid strenuous activities for 6 weeks after surgery, or until your doctor says it is okay. This includes bicycle riding, jogging, weight lifting, or aerobic exercise. · For 6 weeks, avoid lifting anything that would make you strain. This may include a child, heavy grocery bags and milk containers, a heavy briefcase or backpack, cat litter or dog food bags, or a vacuum . · Ask your doctor when you can drive again. · You will probably need to take 1 to 3 weeks off from work. It depends on the type of work you do and how you feel. · Do not put anything in your rectum, such as an enema or suppository, for 4 to 6 weeks after the surgery. · You may shower and take baths when your doctor says it is okay. · Ask your doctor when it is okay for you to have sex. Diet  · You can eat your normal diet. If your stomach is upset, try bland, low-fat foods like plain rice, broiled chicken, toast, and yogurt. · Drink plenty of fluids (unless your doctor tells you not to). · You may notice that your bowel movements are not regular right after your surgery. This is common. Try to avoid constipation and straining with bowel movements. You may want to take a fiber supplement every day. If you have not had a bowel movement after a couple of days, ask your doctor about taking a mild laxative.   Medicines  · Your doctor will tell you if and when you can restart your medicines. He or she will also give you instructions about taking any new medicines. · If you take blood thinners, such as warfarin (Coumadin), clopidogrel (Plavix), or aspirin, be sure to talk to your doctor. He or she will tell you if and when to start taking those medicines again. Make sure that you understand exactly what your doctor wants you to do. · Be safe with medicines. Take pain medicines exactly as directed. ¨ If the doctor gave you a prescription medicine for pain, take it as prescribed. ¨ If you are not taking a prescription pain medicine, ask your doctor if you can take an over-the-counter medicine. · If you think your pain medicine is making you sick to your stomach:  ¨ Take your medicine after meals (unless your doctor has told you not to). ¨ Ask your doctor for a different pain medicine. · If your doctor prescribed antibiotics, take them as directed. Do not stop taking them just because you feel better. You need to take the full course of antibiotics. Follow-up care is a key part of your treatment and safety. Be sure to make and go to all appointments, and call your doctor if you are having problems. It's also a good idea to know your test results and keep a list of the medicines you take. When should you call for help? Call 911 anytime you think you may need emergency care. For example, call if:  · You passed out (lost consciousness). · You have severe trouble breathing. · You have sudden chest pain and shortness of breath, or you cough up blood. Call your doctor now or seek immediate medical care if:  · You cannot urinate. · You are leaking or dripping urine. · You have pain that does not get better after you take pain medicine. · You are sick to your stomach or cannot keep fluids down. · You have pain in your back just below your rib cage. This is called flank pain. · You have a fever, chills, or body aches.   · You have signs of a blood clot, such as:  ¨ Pain in your calf, back of the knee, thigh, or groin. ¨ Redness and swelling in your leg or groin. Watch closely for changes in your health, and be sure to contact your doctor if:  · You do not have a bowel movement after taking a laxative. Where can you learn more? Go to http://azalia-chloé.info/. Enter C140 in the search box to learn more about \"Transurethral Resection of the Prostate (TURP): What to Expect at Home. \"  Current as of: May 24, 2016  Content Version: 11.1  © 1567-7563 [employer]. Care instructions adapted under license by [employer] (which disclaims liability or warranty for this information). If you have questions about a medical condition or this instruction, always ask your healthcare professional. [address] any warranty or liability for your use of this information. Mr. Juan Alberto Mccarty has a reminder for a \"due or due soon\" health maintenance. I have asked that he contact his primary care provider for follow-up on this health maintenance.

## 2017-01-16 NOTE — PROGRESS NOTES
Mr. Linda Koroma has a reminder for a \"due or due soon\" health maintenance. I have asked that he contact his primary care provider for follow-up on this health maintenance.

## 2017-01-16 NOTE — MR AVS SNAPSHOT
Visit Information Date & Time Provider Department Dept. Phone Encounter #  
 1/16/2017 11:00 AM Yahaira Washburn, [facility] [phone] Upcoming Health Maintenance Date Due HEMOGLOBIN A1C Q6M 1945 LIPID PANEL Q1 1943 FOOT EXAM Q1 10/8/1953 MICROALBUMIN Q1 10/8/1953 EYE EXAM RETINAL OR DILATED Q1 10/8/1953 DTaP/Tdap/Td series (1 - Tdap) 10/8/1964 FOBT Q 1 YEAR AGE 50-75 10/8/1993 ZOSTER VACCINE AGE 60> 10/8/2003 GLAUCOMA SCREENING Q2Y 10/8/2008 Pneumococcal 65+ Low/Medium Risk (1 of 2 - PCV13) 10/8/2008 MEDICARE YEARLY EXAM 10/8/2008 INFLUENZA AGE 9 TO ADULT 8/1/2016 Allergies as of 1/16/2017  Review Complete On: 1/13/2017 By: Agueda Sanchez RN Severity Noted Reaction Type Reactions Retaine [facility] [Carboxymethylcellulose Sodium]    Other (comments) Current Immunizations  Never Reviewed No immunizations on file. Not reviewed this visit Vitals BP Pulse Temp Height(growth percentile) SpO2 Smoking Status 120/60 (BP 1 Location: Left arm, BP Patient Position: Sitting) 86 98.1 °F (36.7 °C) 5' 11\" (1.803 m) 93% Current Every Day Smoker Vitals History Preferred Pharmacy Pharmacy Name Phone [facility] [facility] [phone] Your Updated Medication List  
  
   
This list is accurate as of: 1/16/17 11:58 AM.  Always use your most recent med list. ALTACE 2.5 mg capsule Generic drug:  ramipril Take 2.5 mg by mouth daily. amitriptyline 50 mg tablet Commonly known as:  ELAVIL TAKE ONE TABLET BY MOUTH ONCE DAILY AT  NIGHT  
  
 aspirin delayed-release 81 mg tablet Take 81 mg by mouth daily. ciprofloxacin HCl 250 mg tablet Commonly known as:  CIPRO Take 1 Tab by mouth every twelve (12) hours for 5 days. * COLACE 100 mg capsule Generic drug:  docusate sodium Take 100 mg by mouth two (2) times a day. * docusate sodium 100 mg capsule Commonly known as:  Kalyan Call Take 1 Cap by mouth two (2) times a day for 5 days. COREG 6.25 mg tablet Generic drug:  carvedilol Take 6.25 mg by mouth two (2) times daily (with meals). furosemide 80 mg tablet Commonly known as:  LASIX Take  by mouth daily. metFORMIN 1,000 mg tablet Commonly known as:  GLUCOPHAGE Take 1,000 mg by mouth two (2) times daily (with meals). omeprazole 20 mg capsule Commonly known as:  PRILOSEC Take 20 mg by mouth daily. * oxyCODONE-acetaminophen  mg per tablet Commonly known as:  PERCOCET 10 Take 1 Tab by mouth every eight (8) hours as needed for Pain. Max Daily Amount: 3 Tabs. * oxyCODONE-acetaminophen 5-325 mg per tablet Commonly known as:  PERCOCET Take 1 Tab by mouth every four (4) hours as needed for Pain. Max Daily Amount: 6 Tabs. PROAIR HFA 90 mcg/actuation inhaler Generic drug:  albuterol Take 1 Puff by inhalation. rOPINIRole 0.25 mg tablet Commonly known as:  REQUIP  
  
 SPIRIVA WITH HANDIHALER 18 mcg inhalation capsule Generic drug:  tiotropium Take 1 Cap by inhalation daily. TYLENOL 325 mg tablet Generic drug:  acetaminophen Take  by mouth every four (4) hours as needed for Pain. WELLBUTRIN PO Take  by mouth two (2) times a day. ZOCOR 20 mg tablet Generic drug:  simvastatin Take 20 mg by mouth nightly. * Notice: This list has 4 medication(s) that are the same as other medications prescribed for you. Read the directions carefully, and ask your doctor or other care provider to review them with you. Patient Instructions Transurethral Resection of the Prostate (TURP): What to Expect at [facility] Your Recovery Transurethral resection of the prostate (TURP) is surgery to remove a section of the prostate gland.  This is done when the prostate gland has grown too large. The prostate gland is a walnut-sized organ in men that grows around the urethra. The urethra is the tube that carries urine from the bladder, through the penis, to outside the body. The prostate gland produces most of the fluid in semen. You may need a urinary catheter for a short time. A urinary catheter is a flexible plastic tube used to drain urine from your bladder when you cannot urinate on your own. If it is still in place when you go home, your doctor will give you instructions on how to care for your catheter. For several days after surgery, you may feel burning when you urinate. Your urine may be pink for 1 to 3 weeks after surgery. You also may have bladder cramps, or spasms. Your doctor may give you medicine to help control the spasms. You may still feel like you need to urinate often in the weeks after your surgery. It often takes up to 6 weeks for this to get better. Once you have healed, you may have less trouble urinating. You may have better control over starting and stopping your urine stream and feel like you get more relief when you urinate. Most men can return to work or many of their usual activities in 1 to 3 weeks. But for about 6 weeks, try to avoid heavy lifting and strenuous activities that might put extra pressure on your bladder. Most men still can have erections after surgery (if they were able to have them before surgery), but they may not ejaculate when they have an orgasm. Semen may go into the bladder instead of out through the penis. This is called retrograde ejaculation. This does not hurt and is not harmful to your health. But it may mean that you will not be able to father a child. If this is a concern, talk to your doctor about saving your sperm before the surgery. This care sheet gives you a general idea about how long it will take for you to recover. But each person recovers at a different pace.  Follow the steps below to get better as quickly as possible. How can you care for yourself at home? Activity · Rest when you feel tired. Getting enough sleep will help you recover. · Try to walk each day. Start by walking a little more than you did the day before. Bit by bit, increase the amount you walk. Walking boosts blood flow and helps prevent pneumonia and constipation. · Avoid strenuous activities for 6 weeks after surgery, or until your doctor says it is okay. This includes bicycle riding, jogging, weight lifting, or aerobic exercise. · For 6 weeks, avoid lifting anything that would make you strain. This may include a child, heavy grocery bags and milk containers, a heavy briefcase or backpack, cat litter or dog food bags, or a vacuum . · Ask your doctor when you can drive again. · You will probably need to take 1 to 3 weeks off from work. It depends on the type of work you do and how you feel. · Do not put anything in your rectum, such as an enema or suppository, for 4 to 6 weeks after the surgery. · You may shower and take baths when your doctor says it is okay. · Ask your doctor when it is okay for you to have sex. Diet · You can eat your normal diet. If your stomach is upset, try bland, low-fat foods like plain rice, broiled chicken, toast, and yogurt. · Drink plenty of fluids (unless your doctor tells you not to). · You may notice that your bowel movements are not regular right after your surgery. This is common. Try to avoid constipation and straining with bowel movements. You may want to take a fiber supplement every day. If you have not had a bowel movement after a couple of days, ask your doctor about taking a mild laxative. Medicines · Your doctor will tell you if and when you can restart your medicines. He or she will also give you instructions about taking any new medicines.  
· If you take blood thinners, such as warfarin (Coumadin), clopidogrel (Plavix), or aspirin, be sure to talk to your doctor. He or she will tell you if and when to start taking those medicines again. Make sure that you understand exactly what your doctor wants you to do. · Be safe with medicines. Take pain medicines exactly as directed. ¨ If the doctor gave you a prescription medicine for pain, take it as prescribed. ¨ If you are not taking a prescription pain medicine, ask your doctor if you can take an over-the-counter medicine. · If you think your pain medicine is making you sick to your stomach: 
¨ Take your medicine after meals (unless your doctor has told you not to). ¨ Ask your doctor for a different pain medicine. · If your doctor prescribed antibiotics, take them as directed. Do not stop taking them just because you feel better. You need to take the full course of antibiotics. Follow-up care is a key part of your treatment and safety. Be sure to make and go to all appointments, and call your doctor if you are having problems. It's also a good idea to know your test results and keep a list of the medicines you take. When should you call for help? Call 911 anytime you think you may need emergency care. For example, call if: 
· You passed out (lost consciousness). · You have severe trouble breathing. · You have sudden chest pain and shortness of breath, or you cough up blood. Call your doctor now or seek immediate medical care if: 
· You cannot urinate. · You are leaking or dripping urine. · You have pain that does not get better after you take pain medicine. · You are sick to your stomach or cannot keep fluids down. · You have pain in your back just below your rib cage. This is called flank pain. · You have a fever, chills, or body aches. · You have signs of a blood clot, such as: 
¨ Pain in your calf, back of the knee, thigh, or groin. ¨ Redness and swelling in your leg or groin.  
Watch closely for changes in your health, and be sure to contact your doctor if: 
· You do not have a bowel movement after taking a laxative. Where can you learn more? Go to http://azalia-chloé.info/. Enter A590 in the search box to learn more about \"Transurethral Resection of the Prostate (TURP): What to Expect at Home. \" Current as of: May 24, 2016 Content Version: 11.1 © 7650-6752 [employer]. Care instructions adapted under license by [employer] (which disclaims liability or warranty for this information). If you have questions about a medical condition or this instruction, always ask your healthcare professional. [address] any warranty or liability for your use of this information. Mr. Inna Martinez has a reminder for a \"due or due soon\" health maintenance. I have asked that he contact his primary care provider for follow-up on this health maintenance. Introducing [facility]! [employer] introduces Pervacio patient portal. Now you can access parts of your medical record, email your doctor's office, and request medication refills online. 1. In your internet browser, go to [email or web address] 2. Click on the First Time User? Click Here link in the Sign In box. You will see the New Member Sign Up page. 3. Enter your Pervacio Access Code exactly as it appears below. You will not need to use this code after youve completed the sign-up process. If you do not sign up before the expiration date, you must request a new code. · Pervacio Access Code: [email or web address] Expires: 4/12/2017  9:48 AM 
 
4. Enter the last four digits of your Social Security Number (xxxx) and Date of Birth (mm/dd/yyyy) as indicated and click Submit. You will be taken to the next sign-up page. 5. Create a Pervacio ID. This will be your Pervacio login ID and cannot be changed, so think of one that is secure and easy to remember. 6. Create a Pervacio password. You can change your password at any time. 7. Enter your Password Reset Question and Answer. This can be used at a later time if you forget your password. 8. Enter your e-mail address. You will receive e-mail notification when new information is available in [address] 9. Click Sign Up. You can now view and download portions of your medical record. 10. Click the Download Summary menu link to download a portable copy of your medical information. If you have questions, please visit the Frequently Asked Questions section of the Weaved website. Remember, Weaved is NOT to be used for urgent needs. For medical emergencies, dial 911. Now available from your iPhone and Android! Please provide this summary of care documentation to your next provider. Your primary care clinician is listed as Wolfgang Christiansen. If you have any questions after today's visit, please call [phone].

## 2017-01-17 NOTE — PROGRESS NOTES
Marcus Lai 66 y.o. male     Mr. Ryan Weiner seen today for Abreu catheter removal status post TURP on 13 January 2017 for intractable irritative voiding symptoms unresponsive to medical management    symptomatic BPH with history of chronic prostatitis treated with periodic 3 week courses of Cipro antibiotic therapy-now on alpha-blocker treatment with Flomax which has not alleviated irritative voiding symptoms-here today for PVR assessment of voiding efficiency-cystoscopy assessing irritative voiding symptoms in June 2016 showed unobstructed prostate channel with normal urothelium of the bladder and urethra      Cystoscopy 28 June 2016 showed normal urothelium with unobstructed prostate channel      PSA 0.8 in May 2016          Irritative voiding symptoms progressively worsening over the past 6 months  Patient complains of dysuria with daytime frequency Q2 hours and nocturia 4-5 times per night-symptoms not alleviated significantly by alpha-blocker treatment Flomax   no gross hematuria-No history of fever flank pain or pain in the perineum      Patient has extensive history of perirectal abscess since 2014 requiring numerous drainage procedures under general anesthesia-currently managed by maintenance indwelling rubber drain      Review of Systems:    CNS: No seizure syncope headaches or dizziness no visual changes  Respiratory: No wheezing or shortness of breath  Cardiovascular:Coronary artery disease status post CABG 2014/Hypertension  Intestinal:No dyspepsia diarrhea or constipation  Urinary:Irritative voiding symptoms    Skeletal: No bone or joint pain  Endocrine:Diabetes  Other:      Allergies: Allergies   Allergen Reactions    Retaine Cmc [Carboxymethylcellulose Sodium] Other (comments)      Medications:    Current Outpatient Prescriptions   Medication Sig Dispense Refill    BUPROPION HCL (WELLBUTRIN PO) Take  by mouth two (2) times a day.       docusate sodium (COLACE) 100 mg capsule Take 1 Cap by mouth two (2) times a day for 5 days. 10 Cap 0    ciprofloxacin HCl (CIPRO) 250 mg tablet Take 1 Tab by mouth every twelve (12) hours for 5 days. 10 Tab 0    acetaminophen (TYLENOL) 325 mg tablet Take  by mouth every four (4) hours as needed for Pain.  SPIRIVA WITH HANDIHALER 18 mcg inhalation capsule Take 1 Cap by inhalation daily.  PROAIR HFA 90 mcg/actuation inhaler Take 1 Puff by inhalation.  amitriptyline (ELAVIL) 50 mg tablet TAKE ONE TABLET BY MOUTH ONCE DAILY AT  NIGHT 30 Tab 0    rOPINIRole (REQUIP) 0.25 mg tablet       docusate sodium (COLACE) 100 mg capsule Take 100 mg by mouth two (2) times a day.  omeprazole (PRILOSEC) 20 mg capsule Take 20 mg by mouth daily.  ramipril (ALTACE) 2.5 mg capsule Take 2.5 mg by mouth daily.  carvedilol (COREG) 6.25 mg tablet Take 6.25 mg by mouth two (2) times daily (with meals).  simvastatin (ZOCOR) 20 mg tablet Take 20 mg by mouth nightly.  furosemide (LASIX) 80 mg tablet Take  by mouth daily.  metFORMIN (GLUCOPHAGE) 1,000 mg tablet Take 1,000 mg by mouth two (2) times daily (with meals).  oxyCODONE-acetaminophen (PERCOCET) 5-325 mg per tablet Take 1 Tab by mouth every four (4) hours as needed for Pain. Max Daily Amount: 6 Tabs. 20 Tab 0    oxyCODONE-acetaminophen (PERCOCET 10)  mg per tablet Take 1 Tab by mouth every eight (8) hours as needed for Pain. Max Daily Amount: 3 Tabs. 90 Tab 0    aspirin delayed-release 81 mg tablet Take 81 mg by mouth daily.          Past Medical History   Diagnosis Date    Adverse effect of anesthesia 2012     nightmares in the past with ketamine, no problems with anes since    Anal fistula and Anal tag     CAD (coronary artery disease)     Diabetes (HCC)     Emphysema     Follow-up examination following surgery     RLS (restless legs syndrome)     Unspecified adverse effect of anesthesia      Can not take Ketamine    Unspecified sleep apnea      no cpap      Past Surgical History   Procedure Laterality Date    Hx back surgery  2001    Hx other surgical  2011     \"perirectal\"    Hx hernia repair  2009    Hx gi       Excision of anal tag    Hx gi       Modified Harrison procedure    Pr cardiac surg procedure unlist  1/2014     [id number]  repaired valve    Hx lumbar laminectomy      Hx lumbar fusion       Family History   Problem Relation Age of Onset    No Known Problems Mother     No Known Problems Father         Physical Examination: Well-nourished mature male in no apparent distress-Abreu catheter draining grossly clear urine into a leg bag       Abreu catheter was removed today      Urinalysis: No specimen today    Impression: Stable status post TURP        Plan: rtc 1 week PVR        Carmela Loja MD  -electronically signed-    PLEASE NOTE:  This document has been produced using voice recognition software. Unrecognized errors in transcription may be present.

## 2017-01-25 ENCOUNTER — OFFICE VISIT (OUTPATIENT)
Dept: UROLOGY | Age: 72
End: 2017-01-25

## 2017-01-25 VITALS
HEIGHT: 71 IN | BODY MASS INDEX: 35.42 KG/M2 | OXYGEN SATURATION: 93 % | TEMPERATURE: 97 F | SYSTOLIC BLOOD PRESSURE: 128 MMHG | DIASTOLIC BLOOD PRESSURE: 77 MMHG | HEART RATE: 85 BPM | WEIGHT: 253 LBS

## 2017-01-25 DIAGNOSIS — N40.1 BPH WITH OBSTRUCTION/LOWER URINARY TRACT SYMPTOMS: Primary | ICD-10-CM

## 2017-01-25 DIAGNOSIS — N13.8 BPH WITH OBSTRUCTION/LOWER URINARY TRACT SYMPTOMS: Primary | ICD-10-CM

## 2017-01-25 NOTE — MR AVS SNAPSHOT
Visit Information Date & Time Provider Department Dept. Phone Encounter #  
 1/25/2017  1:45 PM Roxana Rubio, [facility] [id number] Your Appointments 1/25/2017  1:45 PM  
ULTRASOUND with Roxana Rubio MD  
[facility] [address] [facility]) Appt Note: PVR  
 [address] [place] [address] [address]  
[phone] [address] [place] [address] Upcoming Health Maintenance Date Due HEMOGLOBIN A1C Q6M 1945 LIPID PANEL Q1 1945 FOOT EXAM Q1 10/8/1953 MICROALBUMIN Q1 10/8/1953 EYE EXAM RETINAL OR DILATED Q1 10/8/1953 DTaP/Tdap/Td series (1 - Tdap) 10/8/1964 FOBT Q 1 YEAR AGE 50-75 10/8/1993 ZOSTER VACCINE AGE 60> 10/8/2003 GLAUCOMA SCREENING Q2Y 10/8/2008 Pneumococcal 65+ Low/Medium Risk (1 of 2 - PCV13) 10/8/2008 MEDICARE YEARLY EXAM 10/8/2008 INFLUENZA AGE 9 TO ADULT 8/1/2016 Allergies as of 1/25/2017  Review Complete On: 1/25/2017 By: Danica Lara LPN Severity Noted Reaction Type Reactions Retaine Cmc [Carboxymethylcellulose Sodium]    Other (comments) Current Immunizations  Never Reviewed No immunizations on file. Not reviewed this visit You Were Diagnosed With   
  
 Codes Comments BPH with obstruction/lower urinary tract symptoms    -  Primary ICD-10-CM: N40.1, N13.8 ICD-9-CM: 600.01, 599.69 Vitals BP Pulse Temp Height(growth percentile) Weight(growth percentile) SpO2  
 128/77 (BP 1 Location: Left arm, BP Patient Position: Sitting) 85 97 °F (36.1 °C) 5' 11\" (1.803 m) 253 lb (114.8 kg) 93% BMI Smoking Status 35.29 kg/m2 Current Every Day Smoker Vitals History BMI and BSA Data Body Mass Index Body Surface Area  
 35.29 kg/m 2 2.4 m 2 Preferred Pharmacy Pharmacy Name Phone [facility] [address] [facility] [phone] Your Updated Medication List  
  
   
This list is accurate as of: 1/25/17  1:27 PM.  Always use your most recent med list. ALTACE 2.5 mg capsule Generic drug:  ramipril Take 2.5 mg by mouth daily. amitriptyline 50 mg tablet Commonly known as:  ELAVIL TAKE ONE TABLET BY MOUTH ONCE DAILY AT  NIGHT  
  
 aspirin delayed-release 81 mg tablet Take 81 mg by mouth daily. COLACE 100 mg capsule Generic drug:  docusate sodium Take 100 mg by mouth two (2) times a day. COREG 6.25 mg tablet Generic drug:  carvedilol Take 6.25 mg by mouth two (2) times daily (with meals). furosemide 80 mg tablet Commonly known as:  LASIX Take  by mouth daily. metFORMIN 1,000 mg tablet Commonly known as:  GLUCOPHAGE Take 1,000 mg by mouth two (2) times daily (with meals). omeprazole 20 mg capsule Commonly known as:  PRILOSEC Take 20 mg by mouth daily. * oxyCODONE-acetaminophen  mg per tablet Commonly known as:  PERCOCET 10 Take 1 Tab by mouth every eight (8) hours as needed for Pain. Max Daily Amount: 3 Tabs. * oxyCODONE-acetaminophen 5-325 mg per tablet Commonly known as:  PERCOCET Take 1 Tab by mouth every four (4) hours as needed for Pain. Max Daily Amount: 6 Tabs. PROAIR HFA 90 mcg/actuation inhaler Generic drug:  albuterol Take 1 Puff by inhalation. rOPINIRole 0.25 mg tablet Commonly known as:  REQUIP  
  
 SPIRIVA WITH HANDIHALER 18 mcg inhalation capsule Generic drug:  tiotropium Take 1 Cap by inhalation daily. TYLENOL 325 mg tablet Generic drug:  acetaminophen Take  by mouth every four (4) hours as needed for Pain. WELLBUTRIN PO Take  by mouth two (2) times a day. ZOCOR 20 mg tablet Generic drug:  simvastatin Take 20 mg by mouth nightly. * Notice: This list has 2 medication(s) that are the same as other medications prescribed for you. Read the directions carefully, and ask your doctor or other care provider to review them with you. We Performed the Following AMB POC URINALYSIS DIP STICK AUTO W/O MICRO [65038 CPT(R)] Patient Instructions MyChart Activation Thank you for requesting access to edulio. Please follow the instructions below to securely access and download your online medical record. edulio allows you to send messages to your doctor, view your test results, renew your prescriptions, schedule appointments, and more. How Do I Sign Up? 1. In your internet browser, go to [email or web address]. 2. Click on the First Time User? Click Here link in the Sign In box. You will see the New Member Sign Up page. 3. Enter your edulio Access Code exactly as it appears below. You will not need to use this code after youve completed the sign-up process. If you do not sign up before the expiration date, you must request a new code. edulio Access Code: [email or web address] Expires: 2017  9:48 AM (This is the date your edulio access code will ) 4. Enter the last four digits of your Social Security Number (xxxx) and Date of Birth (mm/dd/yyyy) as indicated and click Submit. You will be taken to the next sign-up page. 5. Create a edulio ID. This will be your edulio login ID and cannot be changed, so think of one that is secure and easy to remember. 6. Create a edulio password. You can change your password at any time. 7. Enter your Password Reset Question and Answer. This can be used at a later time if you forget your password. 8. Enter your e-mail address. You will receive e-mail notification when new information is available in [address] 9. Click Sign Up. You can now view and download portions of your medical record. 10. Click the Download Summary menu link to download a portable copy of your medical information. Additional Information If you have questions, please visit the Frequently Asked Questions section of the [employer] website at [email or web address] Remember, [employer] is NOT to be used for urgent needs. For medical emergencies, dial 911. Benign Prostatic Hyperplasia: Care Instructions Your Care Instructions Benign prostatic hyperplasia, or BPH, is an enlarged prostate gland. The prostate is a small gland that makes some of the fluid in semen. Prostate enlargement happens to almost all men as they age. It is usually not serious. BPH does not cause prostate cancer. As the prostate gets bigger, it may partly block the flow of urine. You may have a hard time getting a urine stream started or completely stopped. BPH can cause dribbling. You may have a weak urine stream, or you may have to urinate more often than you used to, especially at night. Most men find these problems easy to manage. You do not need treatment unless your symptoms bother you a lot or you have other problems, such as bladder infections or stones. In these cases, medicines may help. Surgery is not needed unless the urine flow is blocked or the symptoms do not get better with medicine. Follow-up care is a key part of your treatment and safety. Be sure to make and go to all appointments, and call your doctor if you are having problems. It's also a good idea to know your test results and keep a list of the medicines you take. How can you care for yourself at home? · Take plenty of time to urinate. Try to relax. · Try \"double voiding. \" Urinate as much you can, relax for a few moments, and then try to urinate again. · Sit on the toilet to urinate. · Read or think of other things while you are waiting. · Turn on a faucet, or try to picture running water. Some men find that this helps get their urine flowing. · If dribbling is a problem, wash your penis daily to avoid skin irritation and infection. · Avoid caffeine and alcohol. These drinks will increase how often you need to urinate. Spread your fluid intake throughout the day. If the urge to urinate often wakes you at night, limit your fluid intake in the evening. Urinate right before you go to bed. · Many over-the-counter cold and allergy medicines can make the symptoms of BPH worse. Avoid antihistamines, decongestants, and allergy pills, if you can. Read the warnings on the package. · If you take any prescription medicines, especially tranquilizers or antidepressants, ask your doctor or pharmacist whether they can cause urination problems. There may be other medicines you can use that do not cause urinary problems. · Be safe with medicines. Take your medicines exactly as prescribed. Call your doctor if you think you are having a problem with your medicine. When should you call for help? Call your doctor now or seek immediate medical care if: 
· You cannot urinate at all. · You have symptoms of a urinary infection. For example: ¨ You have blood or pus in your urine. ¨ You have pain in your back just below your rib cage. This is called flank pain. ¨ You have a fever, chills, or body aches. ¨ It hurts to urinate. ¨ You have groin or belly pain. Watch closely for changes in your health, and be sure to contact your doctor if: 
· It hurts when you ejaculate. · Your urinary problems get a lot worse or bother you a lot. Where can you learn more? Go to http://azalia-chloé.info/. Enter E961 in the search box to learn more about \"Benign Prostatic Hyperplasia: Care Instructions. \" Current as of: May 24, 2016 Content Version: 11.1 © 4933-1708 PPTV.  Care instructions adapted under license by [employer] (which disclaims liability or warranty for this information). If you have questions about a medical condition or this instruction, always ask your healthcare professional. [address] any warranty or liability for your use of this information. Introducing [facility] SERVICES! Joy [employer] introduces NUVETA patient portal. Now you can access parts of your medical record, email your doctor's office, and request medication refills online. 1. In your internet browser, go to [email or web address] 2. Click on the First Time User? Click Here link in the Sign In box. You will see the New Member Sign Up page. 3. Enter your NUVETA Access Code exactly as it appears below. You will not need to use this code after youve completed the sign-up process. If you do not sign up before the expiration date, you must request a new code. · NUVETA Access Code: [email or web address] Expires: 4/12/2017  9:48 AM 
 
4. Enter the last four digits of your Social Security Number (xxxx) and Date of Birth (mm/dd/yyyy) as indicated and click Submit. You will be taken to the next sign-up page. 5. Create a NUVETA ID. This will be your NUVETA login ID and cannot be changed, so think of one that is secure and easy to remember. 6. Create a NUVETA password. You can change your password at any time. 7. Enter your Password Reset Question and Answer. This can be used at a later time if you forget your password. 8. Enter your e-mail address. You will receive e-mail notification when new information is available in [address] 9. Click Sign Up. You can now view and download portions of your medical record. 10. Click the Download Summary menu link to download a portable copy of your medical information. If you have questions, please visit the Frequently Asked Questions section of the NUVETA website. Remember, NUVETA is NOT to be used for urgent needs. For medical emergencies, dial 911. Now available from your iPhone and Android! Please provide this summary of care documentation to your next provider. Your primary care clinician is listed as Tip Cj. If you have any questions after today's visit, please call [phone].

## 2017-01-25 NOTE — PROGRESS NOTES
Marcus Lai 66 y.o. male     Mr. Jazmine Keene seen today for postop TURP voiding assessment-here today for PVR status post TURP 13 January 2017 for irritable bladder symptoms not responding favorably to alpha-blocker treatment-  symptomatic BPH with history of chronic prostatitis treated with periodic 3 week courses of Cipro antibiotic therapy-now on alpha-blocker treatment with Flomax  which has not alleviated irritative voiding symptoms-here today for PVR assessment of voiding efficiency-cystoscopy assessing irritative voiding symptoms in June 2016 showed unobstructed prostate channel with normal urothelium of the bladder and urethra     Cystoscopy 28 June 2016 showed normal urothelium with unobstructed prostate channel     PSA 0.8 in May 2016        Irritative voiding symptoms progressively worsening over the past 6 months  Patient complains of dysuria with daytime frequency Q2 hours and nocturia 4-5 times per night-symptoms not alleviated significantly by alpha-blocker treatment Flomax   no gross hematuria-No history of fever flank pain or pain in the perineum      Patient has extensive history of perirectal abscess since 2014 requiring numerous drainage procedures under general anesthesia-currently managed by maintenance indwelling rubber drain      Review of Systems:    CNS: No seizure syncope headaches or dizziness no visual changes  Respiratory: No wheezing or shortness of breath  Cardiovascular:Coronary artery disease status post CABG 2014/Hypertension  Intestinal:No dyspepsia diarrhea or constipation  Urinary:Irritative voiding symptoms    Skeletal: No bone or joint pain  Endocrine:Diabetes  Other:     Allergies: Allergies   Allergen Reactions    Retaine Cmc [Carboxymethylcellulose Sodium] Other (comments)      Medications:    Current Outpatient Prescriptions   Medication Sig Dispense Refill    BUPROPION HCL (WELLBUTRIN PO) Take  by mouth two (2) times a day.       acetaminophen (TYLENOL) 325 mg tablet Take  by mouth every four (4) hours as needed for Pain.  SPIRIVA WITH HANDIHALER 18 mcg inhalation capsule Take 1 Cap by inhalation daily.  PROAIR HFA 90 mcg/actuation inhaler Take 1 Puff by inhalation.  amitriptyline (ELAVIL) 50 mg tablet TAKE ONE TABLET BY MOUTH ONCE DAILY AT  NIGHT 30 Tab 0    rOPINIRole (REQUIP) 0.25 mg tablet       docusate sodium (COLACE) 100 mg capsule Take 100 mg by mouth two (2) times a day.  omeprazole (PRILOSEC) 20 mg capsule Take 20 mg by mouth daily.  ramipril (ALTACE) 2.5 mg capsule Take 2.5 mg by mouth daily.  carvedilol (COREG) 6.25 mg tablet Take 6.25 mg by mouth two (2) times daily (with meals).  simvastatin (ZOCOR) 20 mg tablet Take 20 mg by mouth nightly.  metFORMIN (GLUCOPHAGE) 1,000 mg tablet Take 1,000 mg by mouth two (2) times daily (with meals).  oxyCODONE-acetaminophen (PERCOCET) 5-325 mg per tablet Take 1 Tab by mouth every four (4) hours as needed for Pain. Max Daily Amount: 6 Tabs. 20 Tab 0    oxyCODONE-acetaminophen (PERCOCET 10)  mg per tablet Take 1 Tab by mouth every eight (8) hours as needed for Pain. Max Daily Amount: 3 Tabs. 90 Tab 0    aspirin delayed-release 81 mg tablet Take 81 mg by mouth daily.  furosemide (LASIX) 80 mg tablet Take  by mouth daily.            Past Medical History   Diagnosis Date    Adverse effect of anesthesia 2012     nightmares in the past with ketamine, no problems with anes since    Anal fistula and Anal tag     CAD (coronary artery disease)     Diabetes ([facility] [address]     Emphysema     Follow-up examination following surgery     RLS (restless legs syndrome)     Unspecified adverse effect of anesthesia      Can not take Ketamine    Unspecified sleep apnea      no cpap      Past Surgical History   Procedure Laterality Date    Hx back surgery  2001    Hx other surgical  2011     \"perirectal\"    Hx hernia repair  2009    Hx gi       Excision of anal tag    Hx gi Modified Harrison procedure    Pr cardiac surg procedure unlist  1/2014     cabgx2  repaired valve    Hx lumbar laminectomy      Hx lumbar fusion       Family History   Problem Relation Age of Onset    No Known Problems Mother     No Known Problems Father         Physical Examination: Well-nourished mature male in no apparent distress      Urinalysis: Could not provide specimen today    PVR today 161 cc      TURP pathology report 13 January 2017 MS -bvubke histology no evidence of malignancy/chronic inflammatory changes only    Impression: Symptomatic BPH failing medical management  stable status post TURP        Plan: Maintain light physical activity ×4 weeks    Described discussed TURP pathology report      rtc 4 weeks-         Lynette Angelo MD  -electronically signed-    PLEASE NOTE:  This document has been produced using voice recognition software. Unrecognized errors in transcription may be present.

## 2017-01-25 NOTE — PROGRESS NOTES
Mr. Florencia Lee has a reminder for a \"due or due soon\" health maintenance. I have asked that he contact his primary care provider for follow-up on this health maintenance.

## 2017-01-25 NOTE — PATIENT INSTRUCTIONS
[employer] Activation    Thank you for requesting access to [employer]. Please follow the instructions below to securely access and download your online medical record. [employer] allows you to send messages to your doctor, view your test results, renew your prescriptions, schedule appointments, and more. How Do I Sign Up? 1. In your internet browser, go to [email or web address]. 2. Click on the First Time User? Click Here link in the Sign In box. You will see the New Member Sign Up page. 3. Enter your [employer] Access Code exactly as it appears below. You will not need to use this code after youve completed the sign-up process. If you do not sign up before the expiration date, you must request a new code. [employer] Access Code: [email or web address]  Expires: 2017  9:48 AM (This is the date your [employer] access code will )    4. Enter the last four digits of your Social Security Number (xxxx) and Date of Birth (mm/dd/yyyy) as indicated and click Submit. You will be taken to the next sign-up page. 5. Create a [employer] ID. This will be your [employer] login ID and cannot be changed, so think of one that is secure and easy to remember. 6. Create a [employer] password. You can change your password at any time. 7. Enter your Password Reset Question and Answer. This can be used at a later time if you forget your password. 8. Enter your e-mail address. You will receive e-mail notification when new information is available in [address] 9. Click Sign Up. You can now view and download portions of your medical record. 10. Click the Download Summary menu link to download a portable copy of your medical information. Additional Information    If you have questions, please visit the Frequently Asked Questions section of the [employer] website at [email or web address] Remember, [employer] is NOT to be used for urgent needs. For medical emergencies, dial 911.            Benign Prostatic Hyperplasia: Care Instructions  Your Care Instructions    Benign prostatic hyperplasia, or BPH, is an enlarged prostate gland. The prostate is a small gland that makes some of the fluid in semen. Prostate enlargement happens to almost all men as they age. It is usually not serious. BPH does not cause prostate cancer. As the prostate gets bigger, it may partly block the flow of urine. You may have a hard time getting a urine stream started or completely stopped. BPH can cause dribbling. You may have a weak urine stream, or you may have to urinate more often than you used to, especially at night. Most men find these problems easy to manage. You do not need treatment unless your symptoms bother you a lot or you have other problems, such as bladder infections or stones. In these cases, medicines may help. Surgery is not needed unless the urine flow is blocked or the symptoms do not get better with medicine. Follow-up care is a key part of your treatment and safety. Be sure to make and go to all appointments, and call your doctor if you are having problems. It's also a good idea to know your test results and keep a list of the medicines you take. How can you care for yourself at home? · Take plenty of time to urinate. Try to relax. · Try \"double voiding. \" Urinate as much you can, relax for a few moments, and then try to urinate again. · Sit on the toilet to urinate. · Read or think of other things while you are waiting. · Turn on a faucet, or try to picture running water. Some men find that this helps get their urine flowing. · If dribbling is a problem, wash your penis daily to avoid skin irritation and infection. · Avoid caffeine and alcohol. These drinks will increase how often you need to urinate. Spread your fluid intake throughout the day. If the urge to urinate often wakes you at night, limit your fluid intake in the evening. Urinate right before you go to bed.   · Many over-the-counter cold and allergy medicines can make the symptoms of BPH worse. Avoid antihistamines, decongestants, and allergy pills, if you can. Read the warnings on the package. · If you take any prescription medicines, especially tranquilizers or antidepressants, ask your doctor or pharmacist whether they can cause urination problems. There may be other medicines you can use that do not cause urinary problems. · Be safe with medicines. Take your medicines exactly as prescribed. Call your doctor if you think you are having a problem with your medicine. When should you call for help? Call your doctor now or seek immediate medical care if:  · You cannot urinate at all. · You have symptoms of a urinary infection. For example:  ¨ You have blood or pus in your urine. ¨ You have pain in your back just below your rib cage. This is called flank pain. ¨ You have a fever, chills, or body aches. ¨ It hurts to urinate. ¨ You have groin or belly pain. Watch closely for changes in your health, and be sure to contact your doctor if:  · It hurts when you ejaculate. · Your urinary problems get a lot worse or bother you a lot. Where can you learn more? Go to http://azalia-chloé.info/. Enter V267 in the search box to learn more about \"Benign Prostatic Hyperplasia: Care Instructions. \"  Current as of: May 24, 2016  Content Version: 11.1  © 8885-6827 [employer]. Care instructions adapted under license by [employer] (which disclaims liability or warranty for this information). If you have questions about a medical condition or this instruction, always ask your healthcare professional. [place] [address] any warranty or liability for your use of this information.

## 2017-02-23 ENCOUNTER — OFFICE VISIT (OUTPATIENT)
Dept: UROLOGY | Age: 72
End: 2017-02-23

## 2017-02-23 VITALS
HEIGHT: 71 IN | HEART RATE: 93 BPM | DIASTOLIC BLOOD PRESSURE: 70 MMHG | BODY MASS INDEX: 35.42 KG/M2 | WEIGHT: 253 LBS | TEMPERATURE: 97.9 F | SYSTOLIC BLOOD PRESSURE: 140 MMHG | OXYGEN SATURATION: 96 %

## 2017-02-23 DIAGNOSIS — R31.0 GROSS HEMATURIA: Primary | ICD-10-CM

## 2017-02-23 LAB
BILIRUB UR QL STRIP: ABNORMAL
GLUCOSE UR-MCNC: NEGATIVE MG/DL
KETONES P FAST UR STRIP-MCNC: NEGATIVE MG/DL
PH UR STRIP: 8.5 [PH] (ref 4.6–8)
PROT UR QL STRIP: ABNORMAL MG/DL
SP GR UR STRIP: 1.02 (ref 1–1.03)
UA UROBILINOGEN AMB POC: ABNORMAL (ref 0.2–1)
URINALYSIS CLARITY POC: ABNORMAL
URINALYSIS COLOR POC: ABNORMAL
URINE BLOOD POC: ABNORMAL
URINE LEUKOCYTES POC: ABNORMAL
URINE NITRITES POC: NEGATIVE

## 2017-02-23 NOTE — PATIENT INSTRUCTIONS

## 2017-02-23 NOTE — PROGRESS NOTES
Mr. Jada Carrera has a reminder for a \"due or due soon\" health maintenance. I have asked that he contact his primary care provider for follow-up on this health maintenance.

## 2017-02-23 NOTE — PROGRESS NOTES
Marcus Lai 66 y.o. male     Mr. Danay Le seen today for 6 week postop TURP assessment  Vision is voiding with a forceful stream and good control but has had no favorable improvement in urgency or frequency still getting up 3 times per night and experiencing almost hourly daytime urgency and frequency  Voiding na clear urine at this time      Patient's only complaint this time is chronic low back pain      irritable bladder symptoms not responding favorably to alpha-blocker treatment-  symptomatic BPH with history of chronic prostatitis treated with periodic 3 week courses of Cipro antibiotic therapy-now on alpha-blocker treatment with Flomax which has not alleviated irritative voiding symptoms-here today for PVR assessment of voiding efficiency-cystoscopy assessing irritative voiding symptoms in June 2016 showed unobstructed prostate channel with normal urothelium of the bladder and urethra      Cystoscopy 28 June 2016 showed normal urothelium with unobstructed prostate channel      PSA 0.8 in May 2016          Irritative voiding symptoms progressively worsening over the past 6 months  Patient complains of dysuria with daytime frequency Q2 hours and nocturia 4-5 times per night-symptoms not alleviated significantly by alpha-blocker treatment Flomax   no gross hematuria-No history of fever flank pain or pain in the perineum      Patient has extensive history of perirectal abscess since 2014 requiring numerous drainage procedures under general anesthesia-currently managed by maintenance indwelling rubber drain      Review of Systems:    CNS: No seizure syncope headaches or dizziness no visual changes  Respiratory: No wheezing or shortness of breath  Cardiovascular:Coronary artery disease status post CABG 2014/Hypertension  Intestinal:No dyspepsia diarrhea or constipation  Urinary:Irritative voiding symptoms    Skeletal: No bone or joint pain  Endocrine:Diabetes  Other:      Allergies:    Allergies   Allergen Reactions    Retaine Cmc [Carboxymethylcellulose Sodium] Other (comments)      Medications:    Current Outpatient Prescriptions   Medication Sig Dispense Refill    BUPROPION HCL (WELLBUTRIN PO) Take  by mouth two (2) times a day.  oxyCODONE-acetaminophen (PERCOCET) 5-325 mg per tablet Take 1 Tab by mouth every four (4) hours as needed for Pain. Max Daily Amount: 6 Tabs. 20 Tab 0    acetaminophen (TYLENOL) 325 mg tablet Take  by mouth every four (4) hours as needed for Pain.  oxyCODONE-acetaminophen (PERCOCET 10)  mg per tablet Take 1 Tab by mouth every eight (8) hours as needed for Pain. Max Daily Amount: 3 Tabs. 90 Tab 0    SPIRIVA WITH HANDIHALER 18 mcg inhalation capsule Take 1 Cap by inhalation daily.  PROAIR HFA 90 mcg/actuation inhaler Take 1 Puff by inhalation.  amitriptyline (ELAVIL) 50 mg tablet TAKE ONE TABLET BY MOUTH ONCE DAILY AT  NIGHT 30 Tab 0    rOPINIRole (REQUIP) 0.25 mg tablet       docusate sodium (COLACE) 100 mg capsule Take 100 mg by mouth two (2) times a day.  omeprazole (PRILOSEC) 20 mg capsule Take 20 mg by mouth daily.  ramipril (ALTACE) 2.5 mg capsule Take 2.5 mg by mouth daily.  carvedilol (COREG) 6.25 mg tablet Take 6.25 mg by mouth two (2) times daily (with meals).  simvastatin (ZOCOR) 20 mg tablet Take 20 mg by mouth nightly.  furosemide (LASIX) 80 mg tablet Take  by mouth daily.  metFORMIN (GLUCOPHAGE) 1,000 mg tablet Take 1,000 mg by mouth two (2) times daily (with meals).  aspirin delayed-release 81 mg tablet Take 81 mg by mouth daily.          Past Medical History:   Diagnosis Date    Adverse effect of anesthesia 2012    nightmares in the past with ketamine, no problems with anes since    Anal fistula and Anal tag     CAD (coronary artery disease)     Diabetes ([address]     Emphysema     Follow-up examination following surgery     RLS (restless legs syndrome)     Unspecified adverse effect of anesthesia     Can not take Ketamine    Unspecified sleep apnea     no cpap      Past Surgical History:   Procedure Laterality Date    CARDIAC SURG PROCEDURE UNLIST  1/2014    cabgx2  repaired valve    HX BACK SURGERY  2001    HX GI      Excision of anal tag    HX GI      Modified Harrison procedure    HX HERNIA REPAIR  2009    HX LUMBAR FUSION      HX LUMBAR LAMINECTOMY      HX OTHER SURGICAL  2011    \"perirectal\"     Family History   Problem Relation Age of Onset    No Known Problems Mother     No Known Problems Father         Physical Examination: Well-nourished mature male in no apparent distress    Urinalysis: +++heme/negative nitrite/++pro    PVR today 25 cc    Impression: Symptomatic BPH stable status post TURP    Plan: rtc 6 mo    Patient advised to expect gradual improvement in irritable bladder symptoms over the next few months allowing the bladder to become accustomed to unimpeded emptying         Jose Alejandro Dominguez MD  -electronically signed-    PLEASE NOTE:  This document has been produced using voice recognition software. Unrecognized errors in transcription may be present.

## 2017-02-23 NOTE — MR AVS SNAPSHOT
Visit Information Date & Time Provider Department Dept. Phone Encounter #  
 2/23/2017  1:45 PM [facility] [phone] [id number] Your Appointments 8/24/2017  1:30 PM  
ULTRASOUND with Trever Dietz MD  
[facility] [address]) Appt Note: PVR  
 [address] [place] [address] [address]  
[phone] [address] [facility] [address] Upcoming Health Maintenance Date Due HEMOGLOBIN A1C Q6M 1945 LIPID PANEL Q1 1945 FOOT EXAM Q1 10/8/1953 MICROALBUMIN Q1 10/8/1953 EYE EXAM RETINAL OR DILATED Q1 10/8/1953 DTaP/Tdap/Td series (1 - Tdap) 10/8/1964 FOBT Q 1 YEAR AGE 50-75 10/8/1993 ZOSTER VACCINE AGE 60> 10/8/2003 GLAUCOMA SCREENING Q2Y 10/8/2008 Pneumococcal 65+ Low/Medium Risk (1 of 2 - PCV13) 10/8/2008 MEDICARE YEARLY EXAM 10/8/2008 INFLUENZA AGE 9 TO ADULT 8/1/2016 Allergies as of 2/23/2017  Review Complete On: 2/23/2017 By: Palak Reina Severity Noted Reaction Type Reactions Retaine Cmc [Carboxymethylcellulose Sodium]    Other (comments) Current Immunizations  Never Reviewed No immunizations on file. Not reviewed this visit You Were Diagnosed With   
  
 Codes Comments Gross hematuria    -  Primary ICD-10-CM: R31.0 ICD-9-CM: 599.71 Vitals BP  
  
  
  
  
  
 140/70 (BP 1 Location: Left arm, BP Patient Position: Sitting) Vitals History BMI and BSA Data Body Mass Index Body Surface Area  
 35.29 kg/m 2 2.4 m 2 Preferred Pharmacy Pharmacy Name Phone [facility] [address] [place] [phone] Your Updated Medication List  
  
   
This list is accurate as of: 2/23/17  1:54 PM.  Always use your most recent med list. ALTACE 2.5 mg capsule Generic drug:  ramipril Take 2.5 mg by mouth daily. amitriptyline 50 mg tablet Commonly known as:  ELAVIL TAKE ONE TABLET BY MOUTH ONCE DAILY AT  NIGHT  
  
 aspirin delayed-release 81 mg tablet Take 81 mg by mouth daily. COLACE 100 mg capsule Generic drug:  docusate sodium Take 100 mg by mouth two (2) times a day. COREG 6.25 mg tablet Generic drug:  carvedilol Take 6.25 mg by mouth two (2) times daily (with meals). furosemide 80 mg tablet Commonly known as:  LASIX Take  by mouth daily. metFORMIN 1,000 mg tablet Commonly known as:  GLUCOPHAGE Take 1,000 mg by mouth two (2) times daily (with meals). omeprazole 20 mg capsule Commonly known as:  PRILOSEC Take 20 mg by mouth daily. * oxyCODONE-acetaminophen  mg per tablet Commonly known as:  PERCOCET 10 Take 1 Tab by mouth every eight (8) hours as needed for Pain. Max Daily Amount: 3 Tabs. * oxyCODONE-acetaminophen 5-325 mg per tablet Commonly known as:  PERCOCET Take 1 Tab by mouth every four (4) hours as needed for Pain. Max Daily Amount: 6 Tabs. PROAIR HFA 90 mcg/actuation inhaler Generic drug:  albuterol Take 1 Puff by inhalation. rOPINIRole 0.25 mg tablet Commonly known as:  REQUIP  
  
 SPIRIVA WITH HANDIHALER 18 mcg inhalation capsule Generic drug:  tiotropium Take 1 Cap by inhalation daily. TYLENOL 325 mg tablet Generic drug:  acetaminophen Take  by mouth every four (4) hours as needed for Pain. WELLBUTRIN PO Take  by mouth two (2) times a day. ZOCOR 20 mg tablet Generic drug:  simvastatin Take 20 mg by mouth nightly. * Notice: This list has 2 medication(s) that are the same as other medications prescribed for you. Read the directions carefully, and ask your doctor or other care provider to review them with you. We Performed the Following AMB POC URINALYSIS DIP STICK AUTO W/O MICRO [45689 CPT(R)] Patient Instructions Benign Prostatic Hyperplasia: Care Instructions Your Care Instructions Benign prostatic hyperplasia, or BPH, is an enlarged prostate gland. The prostate is a small gland that makes some of the fluid in semen. Prostate enlargement happens to almost all men as they age. It is usually not serious. BPH does not cause prostate cancer. As the prostate gets bigger, it may partly block the flow of urine. You may have a hard time getting a urine stream started or completely stopped. BPH can cause dribbling. You may have a weak urine stream, or you may have to urinate more often than you used to, especially at night. Most men find these problems easy to manage. You do not need treatment unless your symptoms bother you a lot or you have other problems, such as bladder infections or stones. In these cases, medicines may help. Surgery is not needed unless the urine flow is blocked or the symptoms do not get better with medicine. Follow-up care is a key part of your treatment and safety. Be sure to make and go to all appointments, and call your doctor if you are having problems. It's also a good idea to know your test results and keep a list of the medicines you take. How can you care for yourself at home? · Take plenty of time to urinate. Try to relax. · Try \"double voiding. \" Urinate as much you can, relax for a few moments, and then try to urinate again. · Sit on the toilet to urinate. · Read or think of other things while you are waiting. · Turn on a faucet, or try to picture running water. Some men find that this helps get their urine flowing. · If dribbling is a problem, wash your penis daily to avoid skin irritation and infection. · Avoid caffeine and alcohol. These drinks will increase how often you need to urinate. Spread your fluid intake throughout the day. If the urge to urinate often wakes you at night, limit your fluid intake in the evening. Urinate right before you go to bed. · Many over-the-counter cold and allergy medicines can make the symptoms of BPH worse. Avoid antihistamines, decongestants, and allergy pills, if you can. Read the warnings on the package. · If you take any prescription medicines, especially tranquilizers or antidepressants, ask your doctor or pharmacist whether they can cause urination problems. There may be other medicines you can use that do not cause urinary problems. · Be safe with medicines. Take your medicines exactly as prescribed. Call your doctor if you think you are having a problem with your medicine. When should you call for help? Call your doctor now or seek immediate medical care if: 
· You cannot urinate at all. · You have symptoms of a urinary infection. For example: ¨ You have blood or pus in your urine. ¨ You have pain in your back just below your rib cage. This is called flank pain. ¨ You have a fever, chills, or body aches. ¨ It hurts to urinate. ¨ You have groin or belly pain. Watch closely for changes in your health, and be sure to contact your doctor if: 
· It hurts when you ejaculate. · Your urinary problems get a lot worse or bother you a lot. Where can you learn more? Go to http://azalia-chloé.info/. Enter P650 in the search box to learn more about \"Benign Prostatic Hyperplasia: Care Instructions. \" Current as of: May 24, 2016 Content Version: 11.1 © 7724-5254 [employer]. Care instructions adapted under license by [employer] (which disclaims liability or warranty for this information). If you have questions about a medical condition or this instruction, always ask your healthcare professional. [address] any warranty or liability for your use of this information. Introducing [facility]!    
 Ruth Mejias introduces [employer] patient portal. Now you can access parts of your medical record, email your doctor's office, and request medication refills online. 1. In your internet browser, go to [email or web address] 2. Click on the First Time User? Click Here link in the Sign In box. You will see the New Member Sign Up page. 3. Enter your Hively Access Code exactly as it appears below. You will not need to use this code after youve completed the sign-up process. If you do not sign up before the expiration date, you must request a new code. · Hively Access Code: [email or web address] Expires: 4/12/2017  9:48 AM 
 
4. Enter the last four digits of your Social Security Number (xxxx) and Date of Birth (mm/dd/yyyy) as indicated and click Submit. You will be taken to the next sign-up page. 5. Create a Hively ID. This will be your Hively login ID and cannot be changed, so think of one that is secure and easy to remember. 6. Create a Hively password. You can change your password at any time. 7. Enter your Password Reset Question and Answer. This can be used at a later time if you forget your password. 8. Enter your e-mail address. You will receive e-mail notification when new information is available in [address] 9. Click Sign Up. You can now view and download portions of your medical record. 10. Click the Download Summary menu link to download a portable copy of your medical information. If you have questions, please visit the Frequently Asked Questions section of the Hively website. Remember, Hively is NOT to be used for urgent needs. For medical emergencies, dial 911. Now available from your iPhone and Android! Please provide this summary of care documentation to your next provider. Your primary care clinician is listed as Duane Memo. If you have any questions after today's visit, please call [phone].

## 2017-03-13 ENCOUNTER — TELEPHONE (OUTPATIENT)
Dept: ORTHOPEDIC SURGERY | Age: 72
End: 2017-03-13

## 2017-03-14 ENCOUNTER — OFFICE VISIT (OUTPATIENT)
Dept: ORTHOPEDIC SURGERY | Age: 72
End: 2017-03-14

## 2017-03-14 VITALS
RESPIRATION RATE: 18 BRPM | HEIGHT: 71 IN | DIASTOLIC BLOOD PRESSURE: 60 MMHG | BODY MASS INDEX: 36.96 KG/M2 | WEIGHT: 264 LBS | TEMPERATURE: 98 F | SYSTOLIC BLOOD PRESSURE: 125 MMHG | OXYGEN SATURATION: 95 % | HEART RATE: 88 BPM

## 2017-03-14 DIAGNOSIS — M54.50 LOW BACK PAIN, EPISODIC: ICD-10-CM

## 2017-03-14 DIAGNOSIS — M48.061 LUMBAR SPINAL STENOSIS: Primary | ICD-10-CM

## 2017-03-14 RX ORDER — FUROSEMIDE 40 MG/1
40 TABLET ORAL DAILY
COMMUNITY
Start: 2017-02-03 | End: 2018-08-16

## 2017-03-14 RX ORDER — BUPROPION HYDROCHLORIDE 150 MG/1
150 TABLET ORAL
Status: ON HOLD | COMMUNITY
Start: 2016-12-12 | End: 2018-08-17

## 2017-03-14 RX ORDER — OXYCODONE AND ACETAMINOPHEN 10; 325 MG/1; MG/1
1 TABLET ORAL
Qty: 90 TAB | Refills: 0 | Status: SHIPPED | OUTPATIENT
Start: 2017-03-14 | End: 2017-05-23 | Stop reason: SDUPTHER

## 2017-03-14 RX ORDER — LORAZEPAM 1 MG/1
1 TABLET ORAL
Qty: 50 TAB | Refills: 0 | Status: SHIPPED | OUTPATIENT
Start: 2017-03-14 | End: 2017-10-03

## 2017-03-14 RX ORDER — ROPINIROLE 1 MG/1
1 TABLET, FILM COATED ORAL DAILY
COMMUNITY
Start: 2017-02-03

## 2017-03-14 NOTE — PROGRESS NOTES
[facility]  [address]  [place]  Phone: [phone]  Fax: [phone]  PROGRESS NOTE  Patient: Victor Manuel Jain                MRN: [id number]       SSN: [id number]  YOB: 1945        AGE: 66 y.o. SEX: male  Body mass index is 36.82 kg/(m^2). PCP: Rommel Richard MD  03/14/17    Chief Complaint   Patient presents with    Back Pain     follow up       [facility], RADIOGRAPHS, and PLAN:     HISTORY:  Mr. Mariya Palomo returns today. He says he has had a week of a bad flare of his back pain. His back pain has no leg radiation, though occasionally it goes into the right leg. It will kill him. It will come on suddenly. It comes on with loading of his back and then it will resolve itself entirely and spontaneously. He says he can go another week and not require any pain medication at all. He says he will take a Percocet, but more usually, he will simply lie down and his pain will improve. He denies bowel or bladder dysfunction other than his chronic bladder issues, for which he sees Dr. Sayra Waite and bowel issues, for which he sees a colorectal specialist.      RADIOGRAPHS:  I reviewed x-rays taken today. He appears to have a solid lumbar fusion. There is no evidence of lucency, instability, or other issue. ASSESSMENT/PLAN: It is hard to explain the spasmodic nature of his pain. I am not even certain it is directly related to his spine, though that would have to be my assumption. At this point, they are about to take a trip out of state to visit relatives. I will refill his Percocet and also provide him some Ativan to see if that helps him with control of his severe spasms when they come on and help him relax. I will see him back again on their return to the region. cc: Arnulfo Marquez. Georgiana Coreas M.D.          Past Medical History:   Diagnosis Date    Adverse effect of anesthesia 2012    nightmares in the past with ketamine, no problems with anes since    Anal fistula and Anal tag     CAD (coronary artery disease)     Diabetes ([address]     Emphysema     Follow-up examination following surgery     RLS (restless legs syndrome)     Unspecified adverse effect of anesthesia     Can not take Ketamine    Unspecified sleep apnea     no cpap       Family History   Problem Relation Age of Onset    No Known Problems Mother     No Known Problems Father        Current Outpatient Prescriptions   Medication Sig Dispense Refill    buPROPion XL (WELLBUTRIN XL) 150 mg tablet       acetaminophen (TYLENOL) 325 mg tablet Take  by mouth every four (4) hours as needed for Pain.  oxyCODONE-acetaminophen (PERCOCET 10)  mg per tablet Take 1 Tab by mouth every eight (8) hours as needed for Pain. Max Daily Amount: 3 Tabs. 90 Tab 0    SPIRIVA WITH HANDIHALER 18 mcg inhalation capsule Take 1 Cap by inhalation daily.  PROAIR HFA 90 mcg/actuation inhaler Take 1 Puff by inhalation.  amitriptyline (ELAVIL) 50 mg tablet TAKE ONE TABLET BY MOUTH ONCE DAILY AT  NIGHT 30 Tab 0    rOPINIRole (REQUIP) 0.25 mg tablet       docusate sodium (COLACE) 100 mg capsule Take 100 mg by mouth two (2) times a day.  omeprazole (PRILOSEC) 20 mg capsule Take 20 mg by mouth daily.  ramipril (ALTACE) 2.5 mg capsule Take 2.5 mg by mouth daily.  carvedilol (COREG) 6.25 mg tablet Take 6.25 mg by mouth two (2) times daily (with meals).  simvastatin (ZOCOR) 20 mg tablet Take 20 mg by mouth nightly.  furosemide (LASIX) 80 mg tablet Take  by mouth daily.  metFORMIN (GLUCOPHAGE) 1,000 mg tablet Take 1,000 mg by mouth two (2) times daily (with meals).  furosemide (LASIX) 40 mg tablet       rOPINIRole (REQUIP) 1 mg tablet       BUPROPION HCL (WELLBUTRIN PO) Take  by mouth two (2) times a day.       oxyCODONE-acetaminophen (PERCOCET) 5-325 mg per tablet Take 1 Tab by mouth every four (4) hours as needed for Pain. Max Daily Amount: 6 Tabs. 20 Tab 0    aspirin delayed-release 81 mg tablet Take 81 mg by mouth daily. Allergies   Allergen Reactions    Retaine Cmc [Carboxymethylcellulose Sodium] Other (comments)       Past Surgical History:   Procedure Laterality Date    CARDIAC SURG PROCEDURE UNLIST  1/2014    cabgx2  repaired valve    HX BACK SURGERY  2001    HX GI      Excision of anal tag    HX GI      Modified Harrison procedure    HX HERNIA REPAIR  2009    HX LUMBAR FUSION      HX LUMBAR LAMINECTOMY      HX OTHER SURGICAL  2011    \"perirectal\"       Past Medical History:   Diagnosis Date    Adverse effect of anesthesia 2012    nightmares in the past with ketamine, no problems with anes since    Anal fistula and Anal tag     CAD (coronary artery disease)     Diabetes ([address]     Emphysema     Follow-up examination following surgery     RLS (restless legs syndrome)     Unspecified adverse effect of anesthesia     Can not take Ketamine    Unspecified sleep apnea     no cpap       Social History     Social History    Marital status:      Spouse name: N/A    Number of children: N/A    Years of education: N/A     Occupational History    Not on file. Social History Main Topics    Smoking status: Current Every Day Smoker    Smokeless tobacco: Never Used      Comment: 5 cigars daily     Alcohol use No    Drug use: No    Sexual activity: No     Other Topics Concern    Not on file     Social History Narrative       REVIEW OF SYSTEMS:   CONSTITUTIONAL SYMPTOMS:  Negative. EYES:  Negative. EARS, NOSE, THROAT AND MOUTH:  Negative. CARDIOVASCULAR:  Negative. RESPIRATORY:  Negative. GENITOURINARY: Negative. GASTROINTESTINAL:  Negative. INTEGUMENTARY (SKIN AND/OR BREAST):  Negative. MUSCULOSKELETAL: Per HPI.   ENDOCRINE/RHEUMATOLOGIC:  Negative. NEUROLOGICAL:  Per HPI. HEMATOLOGIC/LYMPHATIC:  Negative. ALLERGIC/IMMUNOLOGIC:  Negative.    PSYCHIATRIC: Negative. PHYSICAL EXAMINATION:   Visit Vitals    /60    Pulse 88    Temp 98 °F (36.7 °C) (Oral)    Resp 18    Ht 5' 11\" (1.803 m)    Wt 264 lb (119.7 kg)    SpO2 95%    BMI 36.82 kg/m2    PAIN SCALE: 10 - Worst pain ever/10    CONSTITUTIONAL: The patient is in no apparent distress and is alert and oriented x 3. NEUROLOGICAL: Alert and oriented x 3. Ambulation without assistive device. FWB. EXTREMITIES: See musculoskeletal.  MUSCULOSKELETAL:   Head and Neck:  Negative for misalignment, asymmetry, crepitation, defects, tenderness masses or effusions.  Left Upper Extremity: Inspection, percussion and palpation preformed. Finneys sign is negative.  Right Upper Extremity: Inspection, percussion and palpation preformed. Finneys sign is negative.  Spine, Ribs and Pelvis: Severe back pain that has increased over the past week. Inspection, percussion and palpation preformed. Negative for misalignment, asymmetry, crepitation, defects, tenderness masses or effusions.  Left Lower Extremity: Some anterior thigh pain and paresthesia. Inspection, percussion and palpation preformed. Negative straight leg raise.  Right Lower Extremity: Posterior leg pain. Inspection, percussion and palpation preformed. Negative straight leg raise. SPINE EXAM:     Lumbar spine: No rash, ecchymosis, or gross obliquity. No focal atrophy is noted. ASSESSMENT    ICD-10-CM ICD-9-CM    1. Lumbar spinal stenosis M48.06 724.02 AMB POC XRAY, SPINE, LUMBOSACRAL; 4+   2.  Low back pain, episodic M54.5 724.2 AMB POC XRAY, SPINE, LUMBOSACRAL; 4+       Written by Darío Christianson, as dictated by Curt Pena MD.

## 2017-03-14 NOTE — PATIENT INSTRUCTIONS
Learning About Relief for Back Pain  What is back tension and strain? Back strain happens when you overstretch, or pull, a muscle in your back. You may hurt your back in an accident or when you exercise or lift something. Most back pain will get better with rest and time. You can take care of yourself at home to help your back heal.  What can you do first to relieve back pain? When you first feel back pain, try these steps:  · Walk. Take a short walk (10 to 20 minutes) on a level surface (no slopes, hills, or stairs) every 2 to 3 hours. Walk only distances you can manage without pain, especially leg pain. · Relax. Find a comfortable position for rest. Some people are comfortable on the floor or a medium-firm bed with a small pillow under their head and another under their knees. Some people prefer to lie on their side with a pillow between their knees. Don't stay in one position for too long. · Try heat or ice. Try using a heating pad on a low or medium setting, or take a warm shower, for 15 to 20 minutes every 2 to 3 hours. Or you can buy single-use heat wraps that last up to 8 hours. You can also try an ice pack for 10 to 15 minutes every 2 to 3 hours. You can use an ice pack or a bag of frozen vegetables wrapped in a thin towel. There is not strong evidence that either heat or ice will help, but you can try them to see if they help. You may also want to try switching between heat and cold. · Take pain medicine exactly as directed. ¨ If the doctor gave you a prescription medicine for pain, take it as prescribed. ¨ If you are not taking a prescription pain medicine, ask your doctor if you can take an over-the-counter medicine. What else can you do? · Stretch and exercise. Exercises that increase flexibility may relieve your pain and make it easier for your muscles to keep your spine in a good, neutral position. And don't forget to keep walking. · Do self-massage.  You can use self-massage to unwind after work or school or to energize yourself in the morning. You can easily massage your feet, hands, or neck. Self-massage works best if you are in comfortable clothes and are sitting or lying in a comfortable position. Use oil or lotion to massage bare skin. · Reduce stress. Back pain can lead to a vicious [place]: Distress about the pain tenses the muscles in your back, which in turn causes more pain. Learn how to relax your mind and your muscles to lower your stress. Where can you learn more? Go to http://azalia-chloé.info/. Enter O083 in the search box to learn more about \"Learning About Relief for Back Pain. \"  Current as of: May 23, 2016  Content Version: 11.1  © 6985-3998 [employer]. Care instructions adapted under license by [employer] (which disclaims liability or warranty for this information). If you have questions about a medical condition or this instruction, always ask your healthcare professional. [place] [address] any warranty or liability for your use of this information. Back Care and Preventing Injuries: Care Instructions  Your Care Instructions  You can hurt your back doing many everyday activities: lifting a heavy box, bending down to garden, exercising at the gym, and even getting out of bed. But you can keep your back strong and healthy by doing some exercises. You also can follow a few tips for sitting, sleeping, and lifting to avoid hurting your back again. Talk to your doctor before you start an exercise program. Ask for help if you want to learn more about keeping your back healthy. Follow-up care is a key part of your treatment and safety. Be sure to make and go to all appointments, and call your doctor if you are having problems. It's also a good idea to know your test results and keep a list of the medicines you take. How can you care for yourself at home?   · Stay at a healthy weight to avoid strain on your lower back.  · Do not smoke. Smoking increases the risk of osteoporosis, which weakens the spine. If you need help quitting, talk to your doctor about stop-smoking programs and medicines. These can increase your chances of quitting for good. · Make sure you sleep in a position that maintains your back's normal curves and on a mattress that feels comfortable. Sleep on your side with a pillow between your knees, or sleep on your back with a pillow under your knees. These positions can reduce strain on your back. · When you get out of bed, lie on your side and bend both knees. Drop your feet over the edge of the bed as you push up with both arms. Scoot to the edge of the bed. Make sure your feet are in line with your rear end (buttocks), and then stand up. · If you must stand for a long time, put one foot on a stool, ledge, or box. Exercise to strengthen your back and other muscles  · Get at least 30 minutes of exercise on most days of the week. Walking is a good choice. You also may want to do other activities, such as running, swimming, cycling, or playing tennis or team sports. · Stretch your back muscles. Here are few exercises to try:  Babar Dys on your back with your knees bent and your feet flat on the floor. Gently pull one bent knee to your chest. Put that foot back on the floor, and then pull the other knee to your chest. Hold for 15 to 30 seconds. Repeat 2 to 4 times. ¨ Do pelvic tilts. Lie on your back with your knees bent. Tighten your stomach muscles. Pull your belly button (navel) in and up toward your ribs. You should feel like your back is pressing to the floor and your hips and pelvis are slightly lifting off the floor. Hold for 6 seconds while breathing smoothly. · Keep your core muscles strong. The muscles of your back, belly (abdomen), and buttocks support your spine. ¨ Pull in your belly, and imagine pulling your navel toward your spine. Hold this for 6 seconds, then relax.  Remember to keep breathing normally as you tense your muscles. ¨ Do curl-ups. Always do them with your knees bent. Keep your low back on the floor, and curl your shoulders toward your knees using a smooth, slow motion. Keep your arms folded across your chest. If this bothers your neck, try putting your hands behind your neck (not your head), with your elbows spread apart. ¨ Lie on your back with your knees bent and your feet flat on the floor. Tighten your belly muscles, and then push with your feet and raise your buttocks up a few inches. Hold this position 6 seconds as you continue to breathe normally, then lower yourself slowly to the floor. Repeat 8 to 12 times. ¨ If you like group exercise, try Pilates or yoga. These classes have poses that strengthen the core muscles. Protect your back when you sit  · Place a small pillow, a rolled-up towel, or a lumbar roll in the curve of your back if you need extra support. · Sit in a chair that is low enough to let you place both feet flat on the floor with both knees nearly level with your hips. If your chair or desk is too high, use a foot rest to raise your knees. · When driving, keep your knees nearly level with your hips. Sit straight, and drive with both hands on the steering wheel. Your arms should be in a slightly bent position. · Try a kneeling chair, which helps tilt your hips forward. This takes pressure off your lower back. · Try sitting on an exercise ball. It can rock from side to side, which helps keep your back loose. Lift properly  · Squat down, bending at the hips and knees only. If you need to, put one knee to the floor and extend your other knee in front of you, bent at a right angle (half kneeling). · Press your chest straight forward. This helps keep your upper back straight while keeping a slight arch in your low back. · Hold the load as close to your body as possible, at the level of your navel.   · Use your feet to change direction, taking small steps.  · Lead with your hips as you change direction. Keep your shoulders in line with your hips as you move. Do not twist your body. · Set down your load carefully, squatting with your knees and hips only. When should you call for help? Watch closely for changes in your health, and be sure to contact your doctor if:  · You want more exercises to make your back and other core muscles stronger. Where can you learn more? Go to http://azalia-chloé.info/. Enter S810 in the search box to learn more about \"Back Care and Preventing Injuries: Care Instructions. \"  Current as of: May 23, 2016  Content Version: 11.1  © 5795-7103 [employer]. Care instructions adapted under license by [employer] (which disclaims liability or warranty for this information). If you have questions about a medical condition or this instruction, always ask your healthcare professional. [address] any warranty or liability for your use of this information.

## 2017-05-23 ENCOUNTER — TELEPHONE (OUTPATIENT)
Dept: ORTHOPEDIC SURGERY | Age: 72
End: 2017-05-23

## 2017-05-23 ENCOUNTER — OFFICE VISIT (OUTPATIENT)
Dept: ORTHOPEDIC SURGERY | Age: 72
End: 2017-05-23

## 2017-05-23 VITALS
RESPIRATION RATE: 18 BRPM | OXYGEN SATURATION: 98 % | BODY MASS INDEX: 36.82 KG/M2 | HEART RATE: 80 BPM | TEMPERATURE: 98.2 F | WEIGHT: 264 LBS | SYSTOLIC BLOOD PRESSURE: 151 MMHG | DIASTOLIC BLOOD PRESSURE: 84 MMHG

## 2017-05-23 DIAGNOSIS — M79.18 PAIN IN LEFT BUTTOCK: ICD-10-CM

## 2017-05-23 DIAGNOSIS — Z98.1 S/P LUMBAR SPINAL FUSION: ICD-10-CM

## 2017-05-23 DIAGNOSIS — M48.061 LUMBAR SPINAL STENOSIS: Primary | ICD-10-CM

## 2017-05-23 DIAGNOSIS — M25.552 PAIN OF LEFT HIP JOINT: ICD-10-CM

## 2017-05-23 RX ORDER — OXYCODONE AND ACETAMINOPHEN 10; 325 MG/1; MG/1
1 TABLET ORAL
Qty: 90 TAB | Refills: 0 | Status: SHIPPED | OUTPATIENT
Start: 2017-05-23 | End: 2017-06-20 | Stop reason: SDUPTHER

## 2017-05-23 NOTE — PATIENT INSTRUCTIONS
[facility] Activation    Thank you for requesting access to [facility]. Please follow the instructions below to securely access and download your online medical record. [facility] allows you to send messages to your doctor, view your test results, renew your prescriptions, schedule appointments, and more. How Do I Sign Up? 1. In your internet browser, go to [email or web address]  2. Click on the First Time User? Click Here link in the Sign In box. You will be redirect to the New Member Sign Up page. 3. Enter your [facility] Access Code exactly as it appears below. You will not need to use this code after youve completed the sign-up process. If you do not sign up before the expiration date, you must request a new code. [facility] Access Code: [email or web address]  Expires: 2017  7:54 AM (This is the date your [facility] access code will )    4. Enter the last four digits of your Social Security Number (xxxx) and Date of Birth (mm/dd/yyyy) as indicated and click Submit. You will be taken to the next sign-up page. 5. Create a [facility] ID. This will be your [facility] login ID and cannot be changed, so think of one that is secure and easy to remember. 6. Create a [facility] password. You can change your password at any time. 7. Enter your Password Reset Question and Answer. This can be used at a later time if you forget your password. 8. Enter your e-mail address. You will receive e-mail notification when new information is available in [address]. 9. Click Sign Up. You can now view and download portions of your medical record. 10. Click the Download Summary menu link to download a portable copy of your medical information. Additional Information    If you have questions, please visit the Frequently Asked Questions section of the K[facility] website at [email or web address] Remember, [facility] is NOT to be used for urgent needs. For medical emergencies, dial 911.          Back Care and Preventing Injuries: Care Instructions  Your Care Instructions  You can hurt your back doing many everyday activities: lifting a heavy box, bending down to garden, exercising at the gym, and even getting out of bed. But you can keep your back strong and healthy by doing some exercises. You also can follow a few tips for sitting, sleeping, and lifting to avoid hurting your back again. Talk to your doctor before you start an exercise program. Ask for help if you want to learn more about keeping your back healthy. Follow-up care is a key part of your treatment and safety. Be sure to make and go to all appointments, and call your doctor if you are having problems. It's also a good idea to know your test results and keep a list of the medicines you take. How can you care for yourself at home? · Stay at a healthy weight to avoid strain on your lower back. · Do not smoke. Smoking increases the risk of osteoporosis, which weakens the spine. If you need help quitting, talk to your doctor about stop-smoking programs and medicines. These can increase your chances of quitting for good. · Make sure you sleep in a position that maintains your back's normal curves and on a mattress that feels comfortable. Sleep on your side with a pillow between your knees, or sleep on your back with a pillow under your knees. These positions can reduce strain on your back. · When you get out of bed, lie on your side and bend both knees. Drop your feet over the edge of the bed as you push up with both arms. Scoot to the edge of the bed. Make sure your feet are in line with your rear end (buttocks), and then stand up. · If you must stand for a long time, put one foot on a stool, ledge, or box. Exercise to strengthen your back and other muscles  · Get at least 30 minutes of exercise on most days of the week. Walking is a good choice.  You also may want to do other activities, such as running, swimming, cycling, or playing tennis or team sports. · Stretch your back muscles. Here are few exercises to try:  Maureen Hernandez on your back with your knees bent and your feet flat on the floor. Gently pull one bent knee to your chest. Put that foot back on the floor, and then pull the other knee to your chest. Hold for 15 to 30 seconds. Repeat 2 to 4 times. ¨ Do pelvic tilts. Lie on your back with your knees bent. Tighten your stomach muscles. Pull your belly button (navel) in and up toward your ribs. You should feel like your back is pressing to the floor and your hips and pelvis are slightly lifting off the floor. Hold for 6 seconds while breathing smoothly. · Keep your core muscles strong. The muscles of your back, belly (abdomen), and buttocks support your spine. ¨ Pull in your belly, and imagine pulling your navel toward your spine. Hold this for 6 seconds, then relax. Remember to keep breathing normally as you tense your muscles. ¨ Do curl-ups. Always do them with your knees bent. Keep your low back on the floor, and curl your shoulders toward your knees using a smooth, slow motion. Keep your arms folded across your chest. If this bothers your neck, try putting your hands behind your neck (not your head), with your elbows spread apart. ¨ Lie on your back with your knees bent and your feet flat on the floor. Tighten your belly muscles, and then push with your feet and raise your buttocks up a few inches. Hold this position 6 seconds as you continue to breathe normally, then lower yourself slowly to the floor. Repeat 8 to 12 times. ¨ If you like group exercise, try Pilates or yoga. These classes have poses that strengthen the core muscles. Protect your back when you sit  · Place a small pillow, a rolled-up towel, or a lumbar roll in the curve of your back if you need extra support. · Sit in a chair that is low enough to let you place both feet flat on the floor with both knees nearly level with your hips.  If your chair or desk is too high, use a foot rest to raise your knees. · When driving, keep your knees nearly level with your hips. Sit straight, and drive with both hands on the steering wheel. Your arms should be in a slightly bent position. · Try a kneeling chair, which helps tilt your hips forward. This takes pressure off your lower back. · Try sitting on an exercise ball. It can rock from side to side, which helps keep your back loose. Lift properly  · Squat down, bending at the hips and knees only. If you need to, put one knee to the floor and extend your other knee in front of you, bent at a right angle (half kneeling). · Press your chest straight forward. This helps keep your upper back straight while keeping a slight arch in your low back. · Hold the load as close to your body as possible, at the level of your navel. · Use your feet to change direction, taking small steps. · Lead with your hips as you change direction. Keep your shoulders in line with your hips as you move. Do not twist your body. · Set down your load carefully, squatting with your knees and hips only. When should you call for help? Watch closely for changes in your health, and be sure to contact your doctor if:  · You want more exercises to make your back and other core muscles stronger. Where can you learn more? Go to http://azalia-chloé.info/. Enter S810 in the search box to learn more about \"Back Care and Preventing Injuries: Care Instructions. \"  Current as of: May 23, 2016  Content Version: 11.2  © 1567-7182 [employer]. Care instructions adapted under license by [employer] (which disclaims liability or warranty for this information). If you have questions about a medical condition or this instruction, always ask your healthcare professional. [place] [address] any warranty or liability for your use of this information. Learning About How to Have a Healthy Back  What causes back pain?     Back pain is often caused by overuse, strain, or injury. For example, people often hurt their backs playing sports or working in the yard, being jolted in a car accident, or lifting something too heavy. Aging plays a part too. Your bones and muscles tend to lose strength as you age, which makes injury more likely. The spongy discs between the bones of the spine (vertebrae) may suffer from wear and tear and no longer provide enough cushion between the bones. A disc that bulges or breaks open (herniated disc) can press on nerves, causing back pain. In some people, back pain is the result of arthritis, broken vertebrae caused by bone loss (osteoporosis), illness, or a spine problem. Although most people have back pain at one time or another, there are steps you can take to make it less likely. How can you have a healthy back? Reduce stress on your back through good posture  Slumping or slouching alone may not cause low back pain. But after the back has been strained or injured, bad posture can make pain worse. · Sleep in a position that maintains your back's normal curves and on a mattress that feels comfortable. Sleep on your side with a pillow between your knees, or sleep on your back with a pillow under your knees. These positions can reduce strain on your back. · Stand and sit up straight. \"Good posture\" generally means your ears, shoulders, and hips are in a straight line. · If you must stand for a long time, put one foot on a stool, ledge, or box. Switch feet every now and then. · Sit in a chair that is low enough to let you place both feet flat on the floor with both knees nearly level with your hips. If your chair or desk is too high, use a footrest to raise your knees. Place a small pillow, a rolled-up towel, or a lumbar roll in the curve of your back if you need extra support. · Try a kneeling chair, which helps tilt your hips forward. This takes pressure off your lower back. · Try sitting on an exercise ball. It can rock from side to side, which helps keep your back loose. · When driving, keep your knees nearly level with your hips. Sit straight, and drive with both hands on the steering wheel. Your arms should be in a slightly bent position. Reduce stress on your back through careful lifting  · Squat down, bending at the hips and knees only. If you need to, put one knee to the floor and extend your other knee in front of you, bent at a right angle (half kneeling). · Press your chest straight forward. This helps keep your upper back straight while keeping a slight arch in your low back. · Hold the load as close to your body as possible, at the level of your belly button (navel). · Use your feet to change direction, taking small steps. · Lead with your hips as you change direction. Keep your shoulders in line with your hips as you move. · Set down your load carefully, squatting with your knees and hips only. Exercise and stretch your back  · Do some exercise on most days of the week, if your doctor says it is okay. You can walk, run, swim, or cycle. · Stretch your back muscles. Here are a few exercises to try:  Dionna Donath on your back, and gently pull one bent knee to your chest. Put that foot back on the floor, and then pull the other knee to your chest.  ¨ Do pelvic tilts. Lie on your back with your knees bent. Tighten your stomach muscles. Pull your belly button (navel) in and up toward your ribs. You should feel like your back is pressing to the floor and your hips and pelvis are slightly lifting off the floor. Hold for 6 seconds while breathing smoothly. ¨ Sit with your back flat against a wall. · Keep your core muscles strong. The muscles of your back, belly (abdomen), and buttocks support your spine. ¨ Pull in your belly and imagine pulling your navel toward your spine. Hold this for 6 seconds, then relax. Remember to keep breathing normally as you tense your muscles. ¨ Do curl-ups.  Always do them with your knees bent. Keep your low back on the floor, and curl your shoulders toward your knees using a smooth, slow motion. Keep your arms folded across your chest. If this bothers your neck, try putting your hands behind your neck (not your head), with your elbows spread apart. ¨ Lie on your back with your knees bent and your feet flat on the floor. Tighten your belly muscles, and then push with your feet and raise your buttocks up a few inches. Hold this position 6 seconds as you continue to breathe normally, then lower yourself slowly to the floor. Repeat 8 to 12 times. ¨ If you like group exercise, try Pilates or yoga. These classes have poses that strengthen the core muscles. Lead a healthy lifestyle  · Stay at a healthy weight to avoid strain on your back. · Do not smoke. Smoking increases the risk of osteoporosis, which weakens the spine. If you need help quitting, talk to your doctor about stop-smoking programs and medicines. These can increase your chances of quitting for good. Where can you learn more? Go to http://azalia-chloé.info/. Enter L315 in the search box to learn more about \"Learning About How to Have a Healthy Back. \"  Current as of: May 23, 2016  Content Version: 11.2  © 6897-7556 "[employer]. Care instructions adapted under license by [employer] (which disclaims liability or warranty for this information). If you have questions about a medical condition or this instruction, always ask your healthcare professional. [place] [address] any warranty or liability for your use of this information.

## 2017-05-23 NOTE — TELEPHONE ENCOUNTER
Order and office notes faxed to [facility] Scheduling to have them set up MRI L-spine, MRI Hip, and CT of L-spine and to notify patient of date. [facility] will authorize with insurance if needed. Patient aware.

## 2017-05-23 NOTE — PROGRESS NOTES
[facility]  [address], [address]  [place], [address] [place] [address] Street  Phone: [phone]  Fax: [phone]  PROGRESS NOTE  Patient: Viridiana Carter                MRN: [id number]       SSN: [id number]  YOB: 1945        AGE: 66 y.o. SEX: male  Body mass index is 36.82 kg/(m^2). PCP: Shazia Blunt MD  05/23/17    Chief Complaint   Patient presents with    Back Pain    Follow-up       HISTORY OF PRESENT ILLNESS, RADIOGRAPHS, and PLAN:     HISTORY:  Mr. Mynor Lauren returns today. He is continuing to have this severe left-sided back pain that radiates into his flank. It comes on with sitting and weightbearing on this left side. He is not sure if it is back or if it is hip. He will have several days where it will not hurt him at all and then there are times where he cannot do anything without 10/10 pain. Percocet helps him somewhat, but he really is stuck, almost bed-bound with it. ASSESSMENT/PLAN: I am going to get a new MRI of his back and of his hip and also a CT of the lumbar spine just to see if there is some evidence of some sort of instability, collection, something that would explain the severity of his pain. He has no neurology. One would think it would be coming from some sort of instability or maybe insufficiency fracture, something that would create such horrible pain. He has some pain in his spine, no real pain on range of motion of his hip. I have refilled his Percocet. There is no evidence of medication abuse or misuse. I am not providing him any more Ativan. While it helped him to sleep, he just itches from it, so he had a mild allergic reaction to it, and he will not take it. I will see him back following these studies. cc: Kings Rider. Gabriella So M.D.         Past Medical History:   Diagnosis Date    Adverse effect of anesthesia 2012    nightmares in the past with ketamine, no problems with anes since    Anal fistula and Anal tag     CAD (coronary artery disease)     Diabetes (HCC)     Emphysema     Follow-up examination following surgery     RLS (restless legs syndrome)     Unspecified adverse effect of anesthesia     Can not take Ketamine    Unspecified sleep apnea     no cpap       Family History   Problem Relation Age of Onset    No Known Problems Mother     No Known Problems Father        Current Outpatient Prescriptions   Medication Sig Dispense Refill    buPROPion XL (WELLBUTRIN XL) 150 mg tablet Take 150 mg by mouth every morning.  furosemide (LASIX) 40 mg tablet Take 40 mg by mouth daily.  rOPINIRole (REQUIP) 1 mg tablet Take 1 mg by mouth.  BUPROPION HCL (WELLBUTRIN PO) Take  by mouth two (2) times a day.  acetaminophen (TYLENOL) 325 mg tablet Take  by mouth every four (4) hours as needed for Pain.  SPIRIVA WITH HANDIHALER 18 mcg inhalation capsule Take 1 Cap by inhalation daily.  PROAIR HFA 90 mcg/actuation inhaler Take 1 Puff by inhalation.  amitriptyline (ELAVIL) 50 mg tablet TAKE ONE TABLET BY MOUTH ONCE DAILY AT  NIGHT 30 Tab 0    docusate sodium (COLACE) 100 mg capsule Take 100 mg by mouth two (2) times a day.  omeprazole (PRILOSEC) 20 mg capsule Take 20 mg by mouth daily.  ramipril (ALTACE) 2.5 mg capsule Take 2.5 mg by mouth daily.  carvedilol (COREG) 6.25 mg tablet Take 6.25 mg by mouth two (2) times daily (with meals).  simvastatin (ZOCOR) 20 mg tablet Take 20 mg by mouth nightly.  aspirin delayed-release 81 mg tablet Take 81 mg by mouth daily.  metFORMIN (GLUCOPHAGE) 1,000 mg tablet Take 1,000 mg by mouth two (2) times daily (with meals).  LORazepam (ATIVAN) 1 mg tablet Take 1 Tab by mouth every eight (8) hours as needed for Anxiety. Max Daily Amount: 3 mg. 50 Tab 0    oxyCODONE-acetaminophen (PERCOCET 10)  mg per tablet Take 1 Tab by mouth every eight (8) hours as needed for Pain. Max Daily Amount: 3 Tabs.  90 Tab 0    oxyCODONE-acetaminophen (PERCOCET) 5-325 mg per tablet Take 1 Tab by mouth every four (4) hours as needed for Pain. Max Daily Amount: 6 Tabs. 20 Tab 0    rOPINIRole (REQUIP) 0.25 mg tablet Take 0.25 mg by mouth.  furosemide (LASIX) 80 mg tablet Take  by mouth daily. Allergies   Allergen Reactions    Ativan [Lorazepam] Itching    Ketamine Drowsiness     Pt reports the drowsiness is excessive    Retaine Cmc [Carboxymethylcellulose Sodium] Other (comments)       Past Surgical History:   Procedure Laterality Date    CARDIAC SURG PROCEDURE UNLIST  1/2014    cabgx2  repaired valve    HX BACK SURGERY  2001    HX GI      Excision of anal tag    HX GI      Modified Harrison procedure    HX HERNIA REPAIR  2009    HX LUMBAR FUSION      HX LUMBAR LAMINECTOMY      HX OTHER SURGICAL  2011    \"perirectal\"       Past Medical History:   Diagnosis Date    Adverse effect of anesthesia 2012    nightmares in the past with ketamine, no problems with anes since    Anal fistula and Anal tag     CAD (coronary artery disease)     Diabetes ([address]     Emphysema     Follow-up examination following surgery     RLS (restless legs syndrome)     Unspecified adverse effect of anesthesia     Can not take Ketamine    Unspecified sleep apnea     no cpap       Social History     Social History    Marital status:      Spouse name: N/A    Number of children: N/A    Years of education: N/A     Occupational History    Not on file. Social History Main Topics    Smoking status: Current Every Day Smoker    Smokeless tobacco: Never Used      Comment: 5 cigars daily     Alcohol use No    Drug use: No    Sexual activity: No     Other Topics Concern    Not on file     Social History Narrative         REVIEW OF SYSTEMS:   CONSTITUTIONAL SYMPTOMS:  Negative. EYES:  Negative. EARS, NOSE, THROAT AND MOUTH:  Negative. CARDIOVASCULAR:  Negative. RESPIRATORY:  Negative.    GENITOURINARY: Negative. GASTROINTESTINAL:  Negative. INTEGUMENTARY (SKIN AND/OR BREAST):  Negative. MUSCULOSKELETAL: Per HPI.   ENDOCRINE/RHEUMATOLOGIC:  Negative. NEUROLOGICAL:  Per HPI. HEMATOLOGIC/LYMPHATIC:  Negative. ALLERGIC/IMMUNOLOGIC:  Negative. PSYCHIATRIC:  Negative. PHYSICAL EXAMINATION:   Visit Vitals    /84    Pulse 80    Temp 98.2 °F (36.8 °C) (Oral)    Resp 18    Wt 264 lb (119.7 kg)    SpO2 98%    BMI 36.82 kg/m2    PAIN SCALE: 10 - Worst pain ever/10    CONSTITUTIONAL: The patient is in no apparent distress and is alert and oriented x 3. HEENT: Normocephalic. Hearing grossly intact. NECK: Supple and symmetric. no tenderness, or masses were felt. RESPIRATORY: No labored breathing. CARDIOVASCULAR: The carotid pulses were normal. Peripheral pulses were 2+. CHEST: Normal AP diameter and normal contour without any kyphoscoliosis. LYMPHATIC: No lymphadenopathy was appreciated in the neck, axillae or groin. SKIN:  Negative for scars, rashes, lesions, or ulcers on the right upper, right lower, left upper, left lower and trunk. NEUROLOGICAL: Alert and oriented x 3. Ambulation without assistive device. FWB. EXTREMITIES: See musculoskeletal.  MUSCULOSKELETAL:   Head and Neck:  Negative for misalignment, asymmetry, crepitation, defects, tenderness masses or effusions.  Left Upper Extremity: Inspection, percussion and palpation preformed. Finneys sign is negative.  Right Upper Extremity: Inspection, percussion and palpation preformed. Finneys sign is negative.  Spine, Ribs and Pelvis: Inspection, percussion and palpation preformed. Negative for misalignment, asymmetry, crepitation, defects, tenderness masses or effusions.  Left Lower Extremity: LT buttock pain. Increased pain with standing, walking, sitting. Offloading buttock. Some groin pain. Inspection, percussion and palpation preformed. Negative straight leg raise.    Right Lower Extremity: Inspection, percussion and palpation preformed. Negative straight leg raise. SPINE EXAM:     Lumbar spine: No rash, ecchymosis, or gross obliquity. No focal atrophy is noted. ASSESSMENT    ICD-10-CM ICD-9-CM    1. Lumbar spinal stenosis M48.06 724.02 MRI LUMB SPINE W WO CONT      CT SPINE LUMB WO CONT   2. S/P lumbar spinal fusion Z98.1 V45.4 MRI LUMB SPINE W WO CONT      CT SPINE LUMB WO CONT   3. Pain of left hip joint M25.552 719.45 MRI LUMB SPINE W WO CONT      MRI HIP LT WO CONT      CT SPINE LUMB WO CONT   4. Pain in left buttock M79.1 729.1 MRI LUMB SPINE W WO CONT      MRI HIP LT WO CONT      CT SPINE LUMB WO CONT       Written by Megan Schaefer, as dictated by Pooja Ingram MD.    I, Dr. Pooaj Ingram MD, confirm that all documentation is accurate.

## 2017-05-24 ENCOUNTER — HOSPITAL ENCOUNTER (OUTPATIENT)
Dept: CT IMAGING | Age: 72
Discharge: HOME | End: 2017-05-24
Attending: ORTHOPAEDIC SURGERY
Payer: MEDICARE

## 2017-05-24 DIAGNOSIS — M48.061 LUMBAR SPINAL STENOSIS: ICD-10-CM

## 2017-05-24 DIAGNOSIS — M79.18 PAIN IN LEFT BUTTOCK: ICD-10-CM

## 2017-05-24 DIAGNOSIS — Z98.1 S/P LUMBAR SPINAL FUSION: ICD-10-CM

## 2017-05-24 DIAGNOSIS — M25.552 PAIN OF LEFT HIP JOINT: ICD-10-CM

## 2017-05-24 PROCEDURE — 72131 CT LUMBAR SPINE W/O DYE: CPT

## 2017-05-28 ENCOUNTER — HOSPITAL ENCOUNTER (OUTPATIENT)
Age: 72
Discharge: HOME | End: 2017-05-28
Attending: ORTHOPAEDIC SURGERY
Payer: MEDICARE

## 2017-05-28 DIAGNOSIS — M79.18 PAIN IN LEFT BUTTOCK: ICD-10-CM

## 2017-05-28 DIAGNOSIS — Z98.1 S/P LUMBAR SPINAL FUSION: ICD-10-CM

## 2017-05-28 DIAGNOSIS — M25.552 PAIN OF LEFT HIP JOINT: ICD-10-CM

## 2017-05-28 DIAGNOSIS — M48.061 LUMBAR SPINAL STENOSIS: ICD-10-CM

## 2017-05-28 LAB — CREAT UR-MCNC: 2 MG/DL (ref 0.6–1.3)

## 2017-05-28 PROCEDURE — 72158 MRI LUMBAR SPINE W/O & W/DYE: CPT

## 2017-05-28 PROCEDURE — A9585 GADOBUTROL INJECTION: HCPCS | Performed by: ORTHOPAEDIC SURGERY

## 2017-05-28 PROCEDURE — 73721 MRI JNT OF LWR EXTRE W/O DYE: CPT

## 2017-05-28 PROCEDURE — 82565 ASSAY OF CREATININE: CPT

## 2017-05-28 PROCEDURE — 74011250636 HC RX REV CODE- 250/636: Performed by: ORTHOPAEDIC SURGERY

## 2017-05-28 RX ADMIN — GADOBUTROL 12 ML: 604.72 INJECTION INTRAVENOUS at 08:00

## 2017-06-20 ENCOUNTER — OFFICE VISIT (OUTPATIENT)
Dept: ORTHOPEDIC SURGERY | Age: 72
End: 2017-06-20

## 2017-06-20 VITALS
HEART RATE: 71 BPM | HEIGHT: 71 IN | DIASTOLIC BLOOD PRESSURE: 83 MMHG | WEIGHT: 260.2 LBS | RESPIRATION RATE: 16 BRPM | OXYGEN SATURATION: 95 % | BODY MASS INDEX: 36.43 KG/M2 | SYSTOLIC BLOOD PRESSURE: 119 MMHG | TEMPERATURE: 97.8 F

## 2017-06-20 DIAGNOSIS — M48.061 LUMBAR SPINAL STENOSIS: Primary | ICD-10-CM

## 2017-06-20 DIAGNOSIS — G03.9 ARACHNOIDITIS: ICD-10-CM

## 2017-06-20 DIAGNOSIS — E11.42 DIABETIC POLYNEUROPATHY ASSOCIATED WITH TYPE 2 DIABETES MELLITUS (HCC): ICD-10-CM

## 2017-06-20 DIAGNOSIS — M96.1 LUMBAR POST-LAMINECTOMY SYNDROME: ICD-10-CM

## 2017-06-20 PROBLEM — E11.40 DIABETIC NEUROPATHY ASSOCIATED WITH TYPE 2 DIABETES MELLITUS (HCC): Status: ACTIVE | Noted: 2017-06-20

## 2017-06-20 RX ORDER — PREGABALIN 50 MG/1
50 CAPSULE ORAL 2 TIMES DAILY
Qty: 60 CAP | Refills: 3 | Status: SHIPPED | OUTPATIENT
Start: 2017-06-20 | End: 2017-10-03

## 2017-06-20 RX ORDER — OXYCODONE AND ACETAMINOPHEN 10; 325 MG/1; MG/1
1 TABLET ORAL
Qty: 90 TAB | Refills: 0 | Status: SHIPPED | OUTPATIENT
Start: 2017-06-20 | End: 2017-10-03 | Stop reason: SDUPTHER

## 2017-06-20 NOTE — PROGRESS NOTES
[facility]  [address], [address]  [facility], [address]  Phone: [phone]  Fax: [phone]  PROGRESS NOTE  Patient: Chris Neumann                MRN: [id number]       SSN: [id number]  YOB: 1945        AGE: 66 y.o. SEX: male  Body mass index is 36.29 kg/(m^2). PCP: María Elena Puente MD  06/20/17    Chief Complaint   Patient presents with    Back Pain     MRI and CT follow up         HISTORY OF PRESENT ILLNESS, RADIOGRAPHS, and PLAN:         HISTORY OF PRESENT ILLNESS:  Mr. Bianka Cintron returns again today. I have reviewed his MRI and CT scans. His spine is stable. I see no evidence of acute changes. No stenosis, instability, or evidence of infection or other issue. In talking to him about his pain, I believe probably the variable factor in initiation of his pain is his perirectal abscess. He feels it is a pressure at various times. It gives rise to pain and relieves itself. It drains episodically. ASSESSMENT/PLAN: I see nothing else to do for his spine. 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 He has known arachnoiditis and inflammatory changes. I have nothing else to be done for that. He takes judicious pain medication. I will see him back again periodically to try to assist when possible, but I see nothing surgically to do at this time. cc: Brendon Snow M.D.               Past Medical History:   Diagnosis Date    Adverse effect of anesthesia 2012    nightmares in the past with ketamine, no problems with anes since    Anal fistula and Anal tag     CAD (coronary artery disease)     Diabetes (HCC)     Emphysema     Follow-up examination following surgery     RLS (restless legs syndrome)     Unspecified adverse effect of anesthesia     Can not take Ketamine    Unspecified sleep apnea     no cpap       Family History   Problem Relation Age of Onset    No Known Problems Mother     No Known Problems Father        Current Outpatient Prescriptions   Medication Sig Dispense Refill    oxyCODONE-acetaminophen (PERCOCET 10)  mg per tablet Take 1 Tab by mouth every eight (8) hours as needed for Pain. Max Daily Amount: 3 Tabs. 90 Tab 0    buPROPion XL (WELLBUTRIN XL) 150 mg tablet Take 150 mg by mouth every morning.  furosemide (LASIX) 40 mg tablet Take 40 mg by mouth daily.  rOPINIRole (REQUIP) 1 mg tablet Take 1 mg by mouth.  LORazepam (ATIVAN) 1 mg tablet Take 1 Tab by mouth every eight (8) hours as needed for Anxiety. Max Daily Amount: 3 mg. 50 Tab 0    BUPROPION HCL (WELLBUTRIN PO) Take  by mouth two (2) times a day.  acetaminophen (TYLENOL) 325 mg tablet Take  by mouth every four (4) hours as needed for Pain.  SPIRIVA WITH HANDIHALER 18 mcg inhalation capsule Take 1 Cap by inhalation daily.  PROAIR HFA 90 mcg/actuation inhaler Take 1 Puff by inhalation.  amitriptyline (ELAVIL) 50 mg tablet TAKE ONE TABLET BY MOUTH ONCE DAILY AT  NIGHT 30 Tab 0    rOPINIRole (REQUIP) 0.25 mg tablet Take 0.25 mg by mouth.  docusate sodium (COLACE) 100 mg capsule Take 100 mg by mouth two (2) times a day.  omeprazole (PRILOSEC) 20 mg capsule Take 20 mg by mouth daily.  ramipril (ALTACE) 2.5 mg capsule Take 2.5 mg by mouth daily.  carvedilol (COREG) 6.25 mg tablet Take 6.25 mg by mouth two (2) times daily (with meals).  simvastatin (ZOCOR) 20 mg tablet Take 20 mg by mouth nightly.  aspirin delayed-release 81 mg tablet Take 81 mg by mouth daily.  furosemide (LASIX) 80 mg tablet Take  by mouth daily.  metFORMIN (GLUCOPHAGE) 1,000 mg tablet Take 1,000 mg by mouth two (2) times daily (with meals).  oxyCODONE-acetaminophen (PERCOCET) 5-325 mg per tablet Take 1 Tab by mouth every four (4) hours as needed for Pain. Max Daily Amount: 6 Tabs.  20 Tab 0       Allergies   Allergen Reactions    Ativan [Lorazepam] Itching    Ketamine Drowsiness     Pt reports the drowsiness is excessive    Retaine Cmc [Carboxymethylcellulose Sodium] Other (comments)       Past Surgical History:   Procedure Laterality Date    CARDIAC SURG PROCEDURE UNLIST  1/2014    cabgx2  repaired valve    HX BACK SURGERY  2001    HX GI      Excision of anal tag    HX GI      Modified Harrison procedure    HX HERNIA REPAIR  2009    HX LUMBAR FUSION      HX LUMBAR LAMINECTOMY      HX OTHER SURGICAL  2011    \"perirectal\"       Past Medical History:   Diagnosis Date    Adverse effect of anesthesia 2012    nightmares in the past with ketamine, no problems with anes since    Anal fistula and Anal tag     CAD (coronary artery disease)     [address] [address]     Emphysema     Follow-up examination following surgery     RLS (restless legs syndrome)     Unspecified adverse effect of anesthesia     Can not take Ketamine    Unspecified sleep apnea     no cpap       Social History     Social History    Marital status:      Spouse name: N/A    Number of children: N/A    Years of education: N/A     Occupational History    Not on file. Social History Main Topics    Smoking status: Current Every Day Smoker    Smokeless tobacco: Never Used      Comment: 5 cigars daily     Alcohol use No    Drug use: No    Sexual activity: No     Other Topics Concern    Not on file     Social History Narrative       REVIEW OF SYSTEMS:   CONSTITUTIONAL SYMPTOMS:  Negative. EYES:  Negative. EARS, NOSE, THROAT AND MOUTH:  Negative. CARDIOVASCULAR:  Negative. RESPIRATORY:  Negative. GENITOURINARY: Negative. GASTROINTESTINAL:  Negative. INTEGUMENTARY (SKIN AND/OR BREAST):  Negative. MUSCULOSKELETAL: Per HPI.   ENDOCRINE/RHEUMATOLOGIC:  Negative. NEUROLOGICAL:  Per HPI. HEMATOLOGIC/LYMPHATIC:  Negative. ALLERGIC/IMMUNOLOGIC:  Negative. PSYCHIATRIC:  Negative.     PHYSICAL EXAMINATION:   Visit Vitals    /83    Pulse 71    Temp 97.8 °F (36.6 °C) (Oral)    Resp 16    Ht 5' 11\" (1.803 m)    Wt 260 lb 3.2 oz (118 kg)    SpO2 95%    BMI 36.29 kg/m2    PAIN SCALE: 3/10    CONSTITUTIONAL: The patient is in no apparent distress and is alert and oriented x 3. NEUROLOGICAL: Alert and oriented x 3. Ambulation without assistive device. FWB. EXTREMITIES:  See musculoskeletal.  MUSCULOSKELETAL:   Head and Neck:  Negative for misalignment, asymmetry, crepitation, defects, tenderness masses or effusions.  Left Upper Extremity: Inspection, percussion and palpation preformed. Finneys sign is negative.  Right Upper Extremity: Inspection, percussion and palpation preformed. Finneys sign is negative.  Spine, Ribs and Pelvis: Inspection, percussion and palpation preformed. Negative for misalignment, asymmetry, crepitation, defects, tenderness masses or effusions.  Left Lower Extremity: Inspection, percussion and palpation preformed. Negative straight leg raise.  Right Lower Extremity: Inspection, percussion and palpation preformed. Negative straight leg raise. SPINE EXAM:     Lumbar spine: No rash, ecchymosis, or gross obliquity. No focal atrophy is noted. ASSESSMENT    ICD-10-CM ICD-9-CM    1. Lumbar spinal stenosis M48.06 724.02    2. Diabetic polyneuropathy associated with type 2 diabetes mellitus (HCC) E11.42 250.60      357.2    3. 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 Lumbar post-laminectomy syndrome M96.1 722.83        Written by Natali Roberts, as dictated by Terrance Perez MD.    I, Dr. Terrance Perez MD, confirm that all documentation is accurate.

## 2017-06-20 NOTE — MR AVS SNAPSHOT
Visit Information Date & Time Provider Department Dept. Phone Encounter #  
 6/20/2017 11:00 AM Domingo Gomez MD [facility] [phone] [id number] Follow-up Instructions Return if symptoms worsen or fail to improve. Follow-up and Disposition History Your Appointments 8/24/2017  1:30 PM  
ULTRASOUND with Yaquelin Wing MD  
[facility] [address]) Appt Note: PVR  
 [address] [place] [address] [address]  
[phone] [address] [facility] [address] Upcoming Health Maintenance Date Due HEMOGLOBIN A1C Q6M 1945 LIPID PANEL Q1 1945 FOOT EXAM Q1 10/8/1953 MICROALBUMIN Q1 10/8/1953 EYE EXAM RETINAL OR DILATED Q1 10/8/1953 DTaP/Tdap/Td series (1 - Tdap) 10/8/1964 FOBT Q 1 YEAR AGE 50-75 10/8/1993 ZOSTER VACCINE AGE 60> 10/8/2003 GLAUCOMA SCREENING Q2Y 10/8/2008 Pneumococcal 65+ Low/Medium Risk (1 of 2 - PCV13) 10/8/2008 MEDICARE YEARLY EXAM 10/8/2008 INFLUENZA AGE 9 TO ADULT 8/1/2017 Allergies as of 6/20/2017  Review Complete On: 6/20/2017 By: Domingo Gomez MD  
  
 Severity Noted Reaction Type Reactions Ativan [Lorazepam]  05/23/2017    Itching Ketamine  05/23/2017    Drowsiness Pt reports the drowsiness is excessive Retaine Cmc [Carboxymethylcellulose Sodium]    Other (comments) Current Immunizations  Never Reviewed No immunizations on file. Not reviewed this visit You Were Diagnosed With   
  
 Codes Comments Lumbar spinal stenosis    -  Primary ICD-10-CM: M48.06 
ICD-9-CM: 724.02 Diabetic polyneuropathy associated with type 2 diabetes mellitus ([facility]     ICD-10-CM: E11.42 
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  
 36.29 kg/m 2 2.43 m 2 Preferred Pharmacy Pharmacy Name Phone [facility] [address] [facility] [phone] Your Updated Medication List  
  
   
This list is accurate as of: 6/20/17 12:23 PM.  Always use your most recent med list. ALTACE 2.5 mg capsule Generic drug:  ramipril Take 2.5 mg by mouth daily. amitriptyline 50 mg tablet Commonly known as:  ELAVIL TAKE ONE TABLET BY MOUTH ONCE DAILY AT  NIGHT  
  
 aspirin delayed-release 81 mg tablet Take 81 mg by mouth daily. COLACE 100 mg capsule Generic drug:  docusate sodium Take 100 mg by mouth two (2) times a day. COREG 6.25 mg tablet Generic drug:  carvedilol Take 6.25 mg by mouth two (2) times daily (with meals). * furosemide 40 mg tablet Commonly known as:  LASIX Take 40 mg by mouth daily. * furosemide 80 mg tablet Commonly known as:  LASIX Take  by mouth daily. LORazepam 1 mg tablet Commonly known as:  ATIVAN Take 1 Tab by mouth every eight (8) hours as needed for Anxiety. Max Daily Amount: 3 mg.  
  
 metFORMIN 1,000 mg tablet Commonly known as:  GLUCOPHAGE Take 1,000 mg by mouth two (2) times daily (with meals). omeprazole 20 mg capsule Commonly known as:  PRILOSEC Take 20 mg by mouth daily. * oxyCODONE-acetaminophen 5-325 mg per tablet Commonly known as:  PERCOCET Take 1 Tab by mouth every four (4) hours as needed for Pain. Max Daily Amount: 6 Tabs. * oxyCODONE-acetaminophen  mg per tablet Commonly known as:  PERCOCET 10 Take 1 Tab by mouth every eight (8) hours as needed for Pain.  Max Daily Amount: 3 Tabs. pregabalin 50 mg capsule Commonly known as:  Cathryne Sit Take 1 Cap by mouth two (2) times a day. Max Daily Amount: 100 mg. Take 1 Tab QHS x1 week and then increase to BID PROAIR HFA 90 mcg/actuation inhaler Generic drug:  albuterol Take 1 Puff by inhalation. * rOPINIRole 0.25 mg tablet Commonly known as:  [place]o Suraj Take 0.25 mg by mouth. * rOPINIRole 1 mg tablet Commonly known as:  Owaneco Suraj Take 1 mg by mouth. SPIRIVA WITH HANDIHALER 18 mcg inhalation capsule Generic drug:  tiotropium Take 1 Cap by inhalation daily. TYLENOL 325 mg tablet Generic drug:  acetaminophen Take  by mouth every four (4) hours as needed for Pain. * WELLBUTRIN PO Take  by mouth two (2) times a day. * buPROPion  mg tablet Commonly known as:  Lorayne Bipin Take 150 mg by mouth every morning. ZOCOR 20 mg tablet Generic drug:  simvastatin Take 20 mg by mouth nightly. * Notice: This list has 8 medication(s) that are the same as other medications prescribed for you. Read the directions carefully, and ask your doctor or other care provider to review them with you. Prescriptions Printed Refills  
 pregabalin (LYRICA) 50 mg capsule 3 Sig: Take 1 Cap by mouth two (2) times a day. Max Daily Amount: 100 mg. Take 1 Tab QHS x1 week and then increase to BID Class: Print Route: Oral  
 oxyCODONE-acetaminophen (PERCOCET 10)  mg per tablet 0 Sig: Take 1 Tab by mouth every eight (8) hours as needed for Pain. Max Daily Amount: 3 Tabs. Class: Print Route: Oral  
  
Follow-up Instructions Return if symptoms worsen or fail to improve. Patient Instructions Back Care and Preventing Injuries: Care Instructions Your Care Instructions You can hurt your back doing many everyday activities: lifting a heavy box, bending down to garden, exercising at the gym, and even getting out of bed. But you can keep your back strong and healthy by doing some exercises. You also can follow a few tips for sitting, sleeping, and lifting to avoid hurting your back again. Talk to your doctor before you start an exercise program. Ask for help if you want to learn more about keeping your back healthy. Follow-up care is a key part of your treatment and safety. Be sure to make and go to all appointments, and call your doctor if you are having problems. It's also a good idea to know your test results and keep a list of the medicines you take. How can you care for yourself at home? · Stay at a healthy weight to avoid strain on your lower back. · Do not smoke. Smoking increases the risk of osteoporosis, which weakens the spine. If you need help quitting, talk to your doctor about stop-smoking programs and medicines. These can increase your chances of quitting for good. · Make sure you sleep in a position that maintains your back's normal curves and on a mattress that feels comfortable. Sleep on your side with a pillow between your knees, or sleep on your back with a pillow under your knees. These positions can reduce strain on your back. · When you get out of bed, lie on your side and bend both knees. Drop your feet over the edge of the bed as you push up with both arms. Scoot to the edge of the bed. Make sure your feet are in line with your rear end (buttocks), and then stand up. · If you must stand for a long time, put one foot on a stool, ledge, or box. Exercise to strengthen your back and other muscles · Get at least 30 minutes of exercise on most days of the week. Walking is a good choice. You also may want to do other activities, such as running, swimming, cycling, or playing tennis or team sports. · Stretch your back muscles. Here are few exercises to try: ¨ Lie on your back with your knees bent and your feet flat on the floor. Gently pull one bent knee to your chest. Put that foot back on the floor, and then pull the other knee to your chest. Hold for 15 to 30 seconds. Repeat 2 to 4 times. ¨ Do pelvic tilts. Lie on your back with your knees bent. Tighten your stomach muscles. Pull your belly button (navel) in and up toward your ribs. You should feel like your back is pressing to the floor and your hips and pelvis are slightly lifting off the floor. Hold for 6 seconds while breathing smoothly. · Keep your core muscles strong. The muscles of your back, belly (abdomen), and buttocks support your spine. ¨ Pull in your belly, and imagine pulling your navel toward your spine. Hold this for 6 seconds, then relax. Remember to keep breathing normally as you tense your muscles. ¨ Do curl-ups. Always do them with your knees bent. Keep your low back on the floor, and curl your shoulders toward your knees using a smooth, slow motion. Keep your arms folded across your chest. If this bothers your neck, try putting your hands behind your neck (not your head), with your elbows spread apart. ¨ Lie on your back with your knees bent and your feet flat on the floor. Tighten your belly muscles, and then push with your feet and raise your buttocks up a few inches. Hold this position 6 seconds as you continue to breathe normally, then lower yourself slowly to the floor. Repeat 8 to 12 times. ¨ If you like group exercise, try Pilates or yoga. These classes have poses that strengthen the core muscles. Protect your back when you sit · Place a small pillow, a rolled-up towel, or a lumbar roll in the curve of your back if you need extra support. · Sit in a chair that is low enough to let you place both feet flat on the floor with both knees nearly level with your hips. If your chair or desk is too high, use a foot rest to raise your knees. · When driving, keep your knees nearly level with your hips.  Sit straight, and drive with both hands on the steering wheel. Your arms should be in a slightly bent position. · Try a kneeling chair, which helps tilt your hips forward. This takes pressure off your lower back. · Try sitting on an exercise ball. It can rock from side to side, which helps keep your back loose. Lift properly · Squat down, bending at the hips and knees only. If you need to, put one knee to the floor and extend your other knee in front of you, bent at a right angle (half kneeling). · Press your chest straight forward. This helps keep your upper back straight while keeping a slight arch in your low back. · Hold the load as close to your body as possible, at the level of your navel. · Use your feet to change direction, taking small steps. · Lead with your hips as you change direction. Keep your shoulders in line with your hips as you move. Do not twist your body. · Set down your load carefully, squatting with your knees and hips only. When should you call for help? Watch closely for changes in your health, and be sure to contact your doctor if: 
· You want more exercises to make your back and other core muscles stronger. Where can you learn more? Go to http://azalia-chloé.info/. Enter S810 in the search box to learn more about \"Back Care and Preventing Injuries: Care Instructions. \" Current as of: March 21, 2017 Content Version: 11.3 © 1374-9107 [employer]ial[employer]. Care instructions adapted under license by Zing (which disclaims liability or warranty for this information). If you have questions about a medical condition or this instruction, always ask your healthcare professional. [address] any warranty or liability for your use of this information. Back Stretches: Exercises Your Care Instructions Here are some examples of exercises for stretching your back.  Start each exercise slowly. Ease off the exercise if you start to have pain. Your doctor or physical therapist will tell you when you can start these exercises and which ones will work best for you. How to do the exercises Overhead stretch 1. Stand comfortably with your feet shoulder-width apart. 2. Looking straight ahead, raise both arms over your head and reach toward the ceiling. Do not allow your head to tilt back. 3. Hold for 15 to 30 seconds, then lower your arms to your sides. 4. Repeat 2 to 4 times. Side stretch 1. Stand comfortably with your feet shoulder-width apart. 2. Raise one arm over your head, and then lean to the other side. 3. Slide your hand down your leg as you let the weight of your arm gently stretch your side muscles. Hold for 15 to 30 seconds. 4. Repeat 2 to 4 times on each side. Press-up 1. Lie on your stomach, supporting your body with your forearms. 2. Press your elbows down into the floor to raise your upper back. As you do this, relax your stomach muscles and allow your back to arch without using your back muscles. As your press up, do not let your hips or pelvis come off the floor. 3. Hold for 15 to 30 seconds, then relax. 4. Repeat 2 to 4 times. Relax and rest 
 
1. Lie on your back with a rolled towel under your neck and a pillow under your knees. Extend your arms comfortably to your sides. 2. Relax and breathe normally. 3. Remain in this position for about 10 minutes. 4. If you can, do this 2 or 3 times each day. Follow-up care is a key part of your treatment and safety. Be sure to make and go to all appointments, and call your doctor if you are having problems. It's also a good idea to know your test results and keep a list of the medicines you take. Where can you learn more? Go to http://azalia-chloé.info/. Enter X972 in the search box to learn more about \"Back Stretches: Exercises. \" Current as of: March 21, 2017 Content Version: 11.3 © 9868-5099 [employer]. Care instructions adapted under license by RiverOne (which disclaims liability or warranty for this information). If you have questions about a medical condition or this instruction, always ask your healthcare professional. [address] any warranty or liability for your use of this information. Introducing [facility]! Angelina Bojorquez introduces [employer] patient portal. Now you can access parts of your medical record, email your doctor's office, and request medication refills online. 1. In your internet browser, go to [email or web address] 2. Click on the First Time User? Click Here link in the Sign In box. You will see the New Member Sign Up page. 3. Enter your [employer] Access Code exactly as it appears below. You will not need to use this code after youve completed the sign-up process. If you do not sign up before the expiration date, you must request a new code. · N[employer] Access Code: [email or web address] Expires: 8/21/2017  7:54 AM 
 
4. Enter the last four digits of your Social Security Number (xxxx) and Date of Birth (mm/dd/yyyy) as indicated and click Submit. You will be taken to the next sign-up page. 5. Create a N[employer]ola ID. This will be your N[employer] login ID and cannot be changed, so think of one that is secure and easy to remember. 6. Create a [facility] password. You can change your password at any time. 7. Enter your Password Reset Question and Answer. This can be used at a later time if you forget your password. 8. Enter your e-mail address. You will receive e-mail notification when new information is available in [address] 9. Click Sign Up. You can now view and download portions of your medical record. 10. Click the Download Summary menu link to download a portable copy of your medical information.  
 
If you have questions, please visit the Frequently Asked Questions section of the QBotix. Remember, Popcutshart is NOT to be used for urgent needs. For medical emergencies, dial 911. Now available from your iPhone and Android! Please provide this summary of care documentation to your next provider. Your primary care clinician is listed as Kp Diego. If you have any questions after today's visit, please call [phone].

## 2017-06-20 NOTE — PATIENT INSTRUCTIONS
Back Care and Preventing Injuries: Care Instructions  Your Care Instructions  You can hurt your back doing many everyday activities: lifting a heavy box, bending down to garden, exercising at the gym, and even getting out of bed. But you can keep your back strong and healthy by doing some exercises. You also can follow a few tips for sitting, sleeping, and lifting to avoid hurting your back again. Talk to your doctor before you start an exercise program. Ask for help if you want to learn more about keeping your back healthy. Follow-up care is a key part of your treatment and safety. Be sure to make and go to all appointments, and call your doctor if you are having problems. It's also a good idea to know your test results and keep a list of the medicines you take. How can you care for yourself at home? · Stay at a healthy weight to avoid strain on your lower back. · Do not smoke. Smoking increases the risk of osteoporosis, which weakens the spine. If you need help quitting, talk to your doctor about stop-smoking programs and medicines. These can increase your chances of quitting for good. · Make sure you sleep in a position that maintains your back's normal curves and on a mattress that feels comfortable. Sleep on your side with a pillow between your knees, or sleep on your back with a pillow under your knees. These positions can reduce strain on your back. · When you get out of bed, lie on your side and bend both knees. Drop your feet over the edge of the bed as you push up with both arms. Scoot to the edge of the bed. Make sure your feet are in line with your rear end (buttocks), and then stand up. · If you must stand for a long time, put one foot on a stool, ledge, or box. Exercise to strengthen your back and other muscles  · Get at least 30 minutes of exercise on most days of the week. Walking is a good choice.  You also may want to do other activities, such as running, swimming, cycling, or playing tennis or team sports. · Stretch your back muscles. Here are few exercises to try:  [place] on your back with your knees bent and your feet flat on the floor. Gently pull one bent knee to your chest. Put that foot back on the floor, and then pull the other knee to your chest. Hold for 15 to 30 seconds. Repeat 2 to 4 times. ¨ Do pelvic tilts. Lie on your back with your knees bent. Tighten your stomach muscles. Pull your belly button (navel) in and up toward your ribs. You should feel like your back is pressing to the floor and your hips and pelvis are slightly lifting off the floor. Hold for 6 seconds while breathing smoothly. · Keep your core muscles strong. The muscles of your back, belly (abdomen), and buttocks support your spine. ¨ Pull in your belly, and imagine pulling your navel toward your spine. Hold this for 6 seconds, then relax. Remember to keep breathing normally as you tense your muscles. ¨ Do curl-ups. Always do them with your knees bent. Keep your low back on the floor, and curl your shoulders toward your knees using a smooth, slow motion. Keep your arms folded across your chest. If this bothers your neck, try putting your hands behind your neck (not your head), with your elbows spread apart. ¨ Lie on your back with your knees bent and your feet flat on the floor. Tighten your belly muscles, and then push with your feet and raise your buttocks up a few inches. Hold this position 6 seconds as you continue to breathe normally, then lower yourself slowly to the floor. Repeat 8 to 12 times. ¨ If you like group exercise, try Pilates or yoga. These classes have poses that strengthen the core muscles. Protect your back when you sit  · Place a small pillow, a rolled-up towel, or a lumbar roll in the curve of your back if you need extra support. · Sit in a chair that is low enough to let you place both feet flat on the floor with both knees nearly level with your hips.  If your chair or desk is too high, use a foot rest to raise your knees. · When driving, keep your knees nearly level with your hips. Sit straight, and drive with both hands on the steering wheel. Your arms should be in a slightly bent position. · Try a kneeling chair, which helps tilt your hips forward. This takes pressure off your lower back. · Try sitting on an exercise ball. It can rock from side to side, which helps keep your back loose. Lift properly  · Squat down, bending at the hips and knees only. If you need to, put one knee to the floor and extend your other knee in front of you, bent at a right angle (half kneeling). · Press your chest straight forward. This helps keep your upper back straight while keeping a slight arch in your low back. · Hold the load as close to your body as possible, at the level of your navel. · Use your feet to change direction, taking small steps. · Lead with your hips as you change direction. Keep your shoulders in line with your hips as you move. Do not twist your body. · Set down your load carefully, squatting with your knees and hips only. When should you call for help? Watch closely for changes in your health, and be sure to contact your doctor if:  · You want more exercises to make your back and other core muscles stronger. Where can you learn more? Go to http://azalia-chloé.info/. Enter S810 in the search box to learn more about \"Back Care and Preventing Injuries: Care Instructions. \"  Current as of: March 21, 2017  Content Version: 11.3  © 2590-2685 [employer]. Care instructions adapted under license by Plynked (which disclaims liability or warranty for this information). If you have questions about a medical condition or this instruction, always ask your healthcare professional. [address] any warranty or liability for your use of this information.        Back Stretches: Exercises  Your Care Instructions  Here are some examples of exercises for stretching your back. Start each exercise slowly. Ease off the exercise if you start to have pain. Your doctor or physical therapist will tell you when you can start these exercises and which ones will work best for you. How to do the exercises  Overhead stretch    1. Stand comfortably with your feet shoulder-width apart. 2. Looking straight ahead, raise both arms over your head and reach toward the ceiling. Do not allow your head to tilt back. 3. Hold for 15 to 30 seconds, then lower your arms to your sides. 4. Repeat 2 to 4 times. Side stretch    1. Stand comfortably with your feet shoulder-width apart. 2. Raise one arm over your head, and then lean to the other side. 3. Slide your hand down your leg as you let the weight of your arm gently stretch your side muscles. Hold for 15 to 30 seconds. 4. Repeat 2 to 4 times on each side. Press-up    1. Lie on your stomach, supporting your body with your forearms. 2. Press your elbows down into the floor to raise your upper back. As you do this, relax your stomach muscles and allow your back to arch without using your back muscles. As your press up, do not let your hips or pelvis come off the floor. 3. Hold for 15 to 30 seconds, then relax. 4. Repeat 2 to 4 times. Relax and rest    1. Lie on your back with a rolled towel under your neck and a pillow under your knees. Extend your arms comfortably to your sides. 2. Relax and breathe normally. 3. Remain in this position for about 10 minutes. 4. If you can, do this 2 or 3 times each day. Follow-up care is a key part of your treatment and safety. Be sure to make and go to all appointments, and call your doctor if you are having problems. It's also a good idea to know your test results and keep a list of the medicines you take. Where can you learn more? Go to http://azalia-chloé.info/.   Enter R664 in the search box to learn more about \"Back Stretches: Exercises. \"  Current as of: March 21, 2017  Content Version: 11.3  © 8086-0088 [employer]. Care instructions adapted under license by "[employer]fold[employer] (which disclaims liability or warranty for this information). If you have questions about a medical condition or this instruction, always ask your healthcare professional. [place] [address] any warranty or liability for your use of this information.

## 2017-08-24 ENCOUNTER — OFFICE VISIT (OUTPATIENT)
Dept: UROLOGY | Age: 72
End: 2017-08-24

## 2017-08-24 ENCOUNTER — HOSPITAL ENCOUNTER (OUTPATIENT)
Dept: LAB | Age: 72
Discharge: HOME | End: 2017-08-24
Payer: MEDICARE

## 2017-08-24 VITALS
DIASTOLIC BLOOD PRESSURE: 78 MMHG | BODY MASS INDEX: 36.96 KG/M2 | WEIGHT: 264 LBS | TEMPERATURE: 97.8 F | HEART RATE: 95 BPM | SYSTOLIC BLOOD PRESSURE: 120 MMHG | OXYGEN SATURATION: 95 % | HEIGHT: 71 IN

## 2017-08-24 DIAGNOSIS — N40.1 BPH ASSOCIATED WITH NOCTURIA: Primary | ICD-10-CM

## 2017-08-24 DIAGNOSIS — N40.1 BPH ASSOCIATED WITH NOCTURIA: ICD-10-CM

## 2017-08-24 DIAGNOSIS — R35.1 BPH ASSOCIATED WITH NOCTURIA: Primary | ICD-10-CM

## 2017-08-24 DIAGNOSIS — R35.1 BPH ASSOCIATED WITH NOCTURIA: ICD-10-CM

## 2017-08-24 LAB
BILIRUB UR QL STRIP: NEGATIVE
GLUCOSE UR-MCNC: NEGATIVE MG/DL
KETONES P FAST UR STRIP-MCNC: NEGATIVE MG/DL
PH UR STRIP: 5.5 [PH] (ref 4.6–8)
PROT UR QL STRIP: NEGATIVE MG/DL
SP GR UR STRIP: 1.01 (ref 1–1.03)
UA UROBILINOGEN AMB POC: NORMAL (ref 0.2–1)
URINALYSIS CLARITY POC: CLEAR
URINALYSIS COLOR POC: YELLOW
URINE BLOOD POC: NORMAL
URINE LEUKOCYTES POC: NORMAL
URINE NITRITES POC: NEGATIVE

## 2017-08-24 PROCEDURE — 84153 ASSAY OF PSA TOTAL: CPT | Performed by: UROLOGY

## 2017-08-24 NOTE — PROGRESS NOTES
Marcus Lai 66 y.o. male     Mr. Jignesh Bee seen today for symptomatic BPH follow-up TURP in 2016  Patient is voiding with a forceful stream good control nocturia once per night  Very happy with functional result of TURP-\" urinating like a teenager\"    voiding with a forceful stream and good control but has had no favorable improvement in urgency or frequency still getting up 3 times per night and experiencing almost hourly daytime urgency and frequency  Voiding na clear urine at this time        Patient's only complaint this time is chronic low back pain        irritable bladder symptoms prior to TURP not responding favorably to alpha-blocker treatment-  symptomatic BPH with history of chronic prostatitis treated with periodic 3 week courses of Cipro antibiotic therapy    Cystoscopy 28 June 2016 showed normal urothelium with unobstructed prostate channel      PSA 0.8 in May 2016      Patient has extensive history of perirectal abscess since 2014 requiring numerous drainage procedures under general anesthesia-currently managed by maintenance indwelling rubber drain      Review of Systems:    CNS: No seizure syncope headaches or dizziness no visual changes  Respiratory: No wheezing or shortness of breath  Cardiovascular:Coronary artery disease status post CABG 2014/Hypertension  Intestinal:No dyspepsia diarrhea or constipation/perirectal abscess with chronic perianal seton drainage  Urinary:Irritative voiding symptoms    Skeletal: No bone or joint pain  Endocrine:Diabetes  Other:    Allergies: Allergies   Allergen Reactions    Ativan [Lorazepam] Itching    Ketamine Drowsiness     Pt reports the drowsiness is excessive    Retaine Cmc [Carboxymethylcellulose Sodium] Other (comments)      Medications:    Current Outpatient Prescriptions   Medication Sig Dispense Refill    furosemide (LASIX) 40 mg tablet Take 40 mg by mouth daily.  rOPINIRole (REQUIP) 1 mg tablet Take 1 mg by mouth.       LORazepam (ATIVAN) 1 mg tablet Take 1 Tab by mouth every eight (8) hours as needed for Anxiety. Max Daily Amount: 3 mg. 50 Tab 0    BUPROPION HCL (WELLBUTRIN PO) Take  by mouth two (2) times a day.  acetaminophen (TYLENOL) 325 mg tablet Take  by mouth every four (4) hours as needed for Pain.  SPIRIVA WITH HANDIHALER 18 mcg inhalation capsule Take 1 Cap by inhalation daily.  PROAIR HFA 90 mcg/actuation inhaler Take 1 Puff by inhalation.  amitriptyline (ELAVIL) 50 mg tablet TAKE ONE TABLET BY MOUTH ONCE DAILY AT  NIGHT 30 Tab 0    rOPINIRole (REQUIP) 0.25 mg tablet Take 0.25 mg by mouth.  docusate sodium (COLACE) 100 mg capsule Take 100 mg by mouth two (2) times a day.  omeprazole (PRILOSEC) 20 mg capsule Take 20 mg by mouth daily.  ramipril (ALTACE) 2.5 mg capsule Take 2.5 mg by mouth daily.  carvedilol (COREG) 6.25 mg tablet Take 6.25 mg by mouth two (2) times daily (with meals).  simvastatin (ZOCOR) 20 mg tablet Take 20 mg by mouth nightly.  aspirin delayed-release 81 mg tablet Take 81 mg by mouth daily.  furosemide (LASIX) 80 mg tablet Take  by mouth daily.  metFORMIN (GLUCOPHAGE) 1,000 mg tablet Take 1,000 mg by mouth two (2) times daily (with meals).  pregabalin (LYRICA) 50 mg capsule Take 1 Cap by mouth two (2) times a day. Max Daily Amount: 100 mg. Take 1 Tab QHS x1 week and then increase to BID 60 Cap 3    oxyCODONE-acetaminophen (PERCOCET 10)  mg per tablet Take 1 Tab by mouth every eight (8) hours as needed for Pain. Max Daily Amount: 3 Tabs. 90 Tab 0    buPROPion XL (WELLBUTRIN XL) 150 mg tablet Take 150 mg by mouth every morning.  oxyCODONE-acetaminophen (PERCOCET) 5-325 mg per tablet Take 1 Tab by mouth every four (4) hours as needed for Pain. Max Daily Amount: 6 Tabs.  20 Tab 0       Past Medical History:   Diagnosis Date    Adverse effect of anesthesia 2012    nightmares in the past with ketamine, no problems with anes since    Anal fistula and Anal tag     CAD (coronary artery disease)     Diabetes ([address]     Emphysema     Follow-up examination following surgery     RLS (restless legs syndrome)     Unspecified adverse effect of anesthesia     Can not take Ketamine    Unspecified sleep apnea     no cpap      Past Surgical History:   Procedure Laterality Date    CARDIAC SURG PROCEDURE UNLIST  1/2014    cabgx2  repaired valve    HX BACK SURGERY  2001    HX GI      Excision of anal tag    HX GI      Modified Harrison procedure    HX HERNIA REPAIR  2009    HX LUMBAR FUSION      HX LUMBAR LAMINECTOMY      HX OTHER SURGICAL  2011    \"perirectal\"     Family History   Problem Relation Age of Onset    No Known Problems Mother     No Known Problems Father         Physical Examination: Well-nourished mature male in no apparent distress  Left-sided perianal seton  Prostate by DEDRA is large rounded benign consistency nontender    Urinalysis: Negative dipstick/nitrite negative    PVR today 64 cc       Impression: Symptomatic BPH relieved by TURP 2016      Plan: PSA today    rtc 1 yr PSA DEDRA      More than 1/2 of this 15 minute visit was spent in counselling and coordination of care, as described above. Federico Juan MD  -electronically signed-    PLEASE NOTE:  This document has been produced using voice recognition software. Unrecognized errors in transcription may be present.

## 2017-08-24 NOTE — MR AVS SNAPSHOT
Visit Information Date & Time Provider Department Dept. Phone Encounter #  
 8/24/2017  1:30 PM Yan Flowers [facility] [phone] [id number] Your Appointments 8/30/2018  1:30 PM  
ULTRASOUND with MD PATRICK Flowers [facility]) Appt Note: PVR/PSA  
 [address] [place] [address] [address]  
[phone] [address] [facility] [address] Upcoming Health Maintenance Date Due HEMOGLOBIN A1C Q6M 1945 LIPID PANEL Q1 1945 FOOT EXAM Q1 10/8/1953 MICROALBUMIN Q1 10/8/1953 EYE EXAM RETINAL OR DILATED Q1 10/8/1953 DTaP/Tdap/Td series (1 - Tdap) 10/8/1964 FOBT Q 1 YEAR AGE 50-75 10/8/1993 ZOSTER VACCINE AGE 60> 8/8/2003 GLAUCOMA SCREENING Q2Y 10/8/2008 Pneumococcal 65+ Low/Medium Risk (1 of 2 - PCV13) 10/8/2008 MEDICARE YEARLY EXAM 10/8/2008 INFLUENZA AGE 9 TO ADULT 8/1/2017 Allergies as of 8/24/2017  Review Complete On: 8/24/2017 By: Cosme Patch Severity Noted Reaction Type Reactions Ativan [Lorazepam]  05/23/2017    Itching Ketamine  05/23/2017    Drowsiness Pt reports the drowsiness is excessive Retaine Cmc [Carboxymethylcellulose Sodium]    Other (comments) Current Immunizations  Never Reviewed No immunizations on file. Not reviewed this visit You Were Diagnosed With   
  
 Codes Comments BPH associated with nocturia    -  Primary ICD-10-CM: N40.1, R35.1 ICD-9-CM: 600.01, 788.43 Vitals BP Pulse Temp Height(growth percentile) Weight(growth percentile) SpO2  
 120/78 (BP 1 Location: Left arm, BP Patient Position: Sitting) 95 97.8 °F (36.6 °C) (Oral) 5' 11\" (1.803 m) 264 lb (119.7 kg) 95% BMI Smoking Status 36.82 kg/m2 Current Every Day Smoker Vitals History BMI and BSA Data  Body Mass Index Body Surface Area  
 36.82 kg/m 2 2.45 m 2  
  
  
 Preferred Pharmacy Pharmacy Name Phone [facility] [address], [address] [place] [phone] Your Updated Medication List  
  
   
This list is accurate as of: 8/24/17  1:45 PM.  Always use your most recent med list. ALTACE 2.5 mg capsule Generic drug:  ramipril Take 2.5 mg by mouth daily. amitriptyline 50 mg tablet Commonly known as:  ELAVIL TAKE ONE TABLET BY MOUTH ONCE DAILY AT  NIGHT  
  
 aspirin delayed-release 81 mg tablet Take 81 mg by mouth daily. COLACE 100 mg capsule Generic drug:  docusate sodium Take 100 mg by mouth two (2) times a day. COREG 6.25 mg tablet Generic drug:  carvedilol Take 6.25 mg by mouth two (2) times daily (with meals). * furosemide 40 mg tablet Commonly known as:  LASIX Take 40 mg by mouth daily. * furosemide 80 mg tablet Commonly known as:  LASIX Take  by mouth daily. LORazepam 1 mg tablet Commonly known as:  ATIVAN Take 1 Tab by mouth every eight (8) hours as needed for Anxiety. Max Daily Amount: 3 mg.  
  
 metFORMIN 1,000 mg tablet Commonly known as:  GLUCOPHAGE Take 1,000 mg by mouth two (2) times daily (with meals). omeprazole 20 mg capsule Commonly known as:  PRILOSEC Take 20 mg by mouth daily. * oxyCODONE-acetaminophen 5-325 mg per tablet Commonly known as:  PERCOCET Take 1 Tab by mouth every four (4) hours as needed for Pain. Max Daily Amount: 6 Tabs. * oxyCODONE-acetaminophen  mg per tablet Commonly known as:  PERCOCET 10 Take 1 Tab by mouth every eight (8) hours as needed for Pain. Max Daily Amount: 3 Tabs. pregabalin 50 mg capsule Commonly known as:  Freddy Moise Take 1 Cap by mouth two (2) times a day. Max Daily Amount: 100 mg. Take 1 Tab QHS x1 week and then increase to BID PROAIR HFA 90 mcg/actuation inhaler Generic drug:  albuterol Take 1 Puff by inhalation. * rOPINIRole 0.25 mg tablet Commonly known as:  [place] Ke Take 0.25 mg by mouth. * rOPINIRole 1 mg tablet Commonly known as:  [place] Ke Take 1 mg by mouth. SPIRIVA WITH HANDIHALER 18 mcg inhalation capsule Generic drug:  tiotropium Take 1 Cap by inhalation daily. TYLENOL 325 mg tablet Generic drug:  acetaminophen Take  by mouth every four (4) hours as needed for Pain. * WELLBUTRIN PO Take  by mouth two (2) times a day. * buPROPion  mg tablet Commonly known as:  [place] Take 150 mg by mouth every morning. ZOCOR 20 mg tablet Generic drug:  simvastatin Take 20 mg by mouth nightly. * Notice: This list has 8 medication(s) that are the same as other medications prescribed for you. Read the directions carefully, and ask your doctor or other care provider to review them with you. We Performed the Following AMB POC URINALYSIS DIP STICK AUTO W/O MICRO [90827 CPT(R)] Introducing [facility] SERVICES! Brandi Lagunas introduces [employer] patient portal. Now you can access parts of your medical record, email your doctor's office, and request medication refills online. 1. In your internet browser, go to [email or web address] 2. Click on the First Time User? Click Here link in the Sign In box. You will see the New Member Sign Up page. 3. Enter your [employer] Access Code exactly as it appears below. You will not need to use this code after youve completed the sign-up process. If you do not sign up before the expiration date, you must request a new code. · [employer] Access Code: [email or web address] Expires: 11/22/2017  1:45 PM 
 
4. Enter the last four digits of your Social Security Number (xxxx) and Date of Birth (mm/dd/yyyy) as indicated and click Submit. You will be taken to the next sign-up page. 5. Create a [employer] ID. This will be your [employer] login ID and cannot be changed, so think of one that is secure and easy to remember. 6. Create a [employer] password. You can change your password at any time. 7. Enter your Password Reset Question and Answer. This can be used at a later time if you forget your password. 8. Enter your e-mail address. You will receive e-mail notification when new information is available in [address] 9. Click Sign Up. You can now view and download portions of your medical record. 10. Click the Download Summary menu link to download a portable copy of your medical information. If you have questions, please visit the Frequently Asked Questions section of the [employer] website. Remember, [employer] is NOT to be used for urgent needs. For medical emergencies, dial 911. Now available from your iPhone and Android! Please provide this summary of care documentation to your next provider. Your primary care clinician is listed as Paige Elam. If you have any questions after today's visit, please call [phone].

## 2017-08-25 LAB — PSA SERPL-MCNC: 0.5 NG/ML (ref 0–4)

## 2017-09-26 ENCOUNTER — TELEPHONE (OUTPATIENT)
Dept: ORTHOPEDIC SURGERY | Age: 72
End: 2017-09-26

## 2017-09-26 NOTE — TELEPHONE ENCOUNTER
We have not seen this patient since June. No UDS/PA. Per his last note, I see nothing else to do for his spine. 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 He has known arachnoiditis and inflammatory changes. I have nothing else to be done for that. We will not be taking over pain medications.

## 2017-09-26 NOTE — TELEPHONE ENCOUNTER
Need refill of Percocet 10/325 mg Call when RX ready for  at [address]

## 2017-09-27 NOTE — TELEPHONE ENCOUNTER
Patients wife, Kolton Aguiar (of per HIPAA), returned call to nurse.   Please try them again at [phone]

## 2017-09-27 NOTE — TELEPHONE ENCOUNTER
Attempted to reach patient but recording states that voicemail hasn't been set up yet. Will try again.

## 2017-09-27 NOTE — TELEPHONE ENCOUNTER
Patient's wife, Macey Tyler (of per HIPAA),  Is returning the call to the nurse.   Please try her again at [phone]

## 2017-09-27 NOTE — TELEPHONE ENCOUNTER
Spoke with Sarah Garcia and set the appointment for Mr Eusebia Stephens on October 3, 2017 at 11:00 with NIRAV Weller.

## 2017-10-03 ENCOUNTER — OFFICE VISIT (OUTPATIENT)
Dept: ORTHOPEDIC SURGERY | Age: 72
End: 2017-10-03

## 2017-10-03 VITALS
RESPIRATION RATE: 18 BRPM | HEART RATE: 72 BPM | DIASTOLIC BLOOD PRESSURE: 56 MMHG | SYSTOLIC BLOOD PRESSURE: 125 MMHG | WEIGHT: 268 LBS | HEIGHT: 71 IN | OXYGEN SATURATION: 92 % | TEMPERATURE: 98.1 F | BODY MASS INDEX: 37.52 KG/M2

## 2017-10-03 DIAGNOSIS — S32.009K PSEUDOARTHROSIS OF LUMBAR SPINE: ICD-10-CM

## 2017-10-03 DIAGNOSIS — Z79.891 ENCOUNTER FOR LONG-TERM OPIATE ANALGESIC USE: ICD-10-CM

## 2017-10-03 DIAGNOSIS — M54.50 LOW BACK PAIN, EPISODIC: Primary | ICD-10-CM

## 2017-10-03 DIAGNOSIS — Z98.1 S/P LUMBAR SPINAL FUSION: ICD-10-CM

## 2017-10-03 DIAGNOSIS — M96.1 LUMBAR POST-LAMINECTOMY SYNDROME: ICD-10-CM

## 2017-10-03 RX ORDER — OXYCODONE AND ACETAMINOPHEN 10; 325 MG/1; MG/1
1 TABLET ORAL
Qty: 90 TAB | Refills: 0 | Status: SHIPPED | OUTPATIENT
Start: 2017-10-03 | End: 2018-01-02 | Stop reason: SDUPTHER

## 2017-10-03 NOTE — PROGRESS NOTES
[address]  [address], [address]  [place], [address] [place] [address] Street  Phone: [phone]  Fax: [phone]    Marquis Dill  : 1945  PCP: Milagro Crowell MD    PROGRESS NOTE    HISTORY OF PRESENT ILLNESS:  Chief Complaint   Patient presents with    Back Pain     follow up     Mo Horta is a 66 y.o.  male with history of severe left- sided back pain that radiates into his flank. His pain is worse with sitting and weightbearing. Dr. Andres Winters saw him in  and reviewed his MRI and CT. His spine was stable with no evidence of stenosis, instability or evidence of infection. He has a known perirectal abscess and this is where the pain is believed to be coming from. Dr. Andres Winters had discussed the possibility of a SCS but with the known chronic abscess he was reluctant due to the possibility of infection. He saw nothing surgical. Today, he says his pain will flare intermittently. He will have low back and pain radiating to his bilateral anterior thighs to his feet. This is an achy, tingling sensation which progresses to numbness. Denies bladder/bowel dysfunction, saddle paresthesia, weakness, gait disturbance, or other neurological deficit. States he has been using 1.5 percocet daily PRN to every few days with relief. This brings his pain to a manageable level where he can perform his daily activities. Pt at this time desires to  continue with current care/proceed with medication evaluation. Opioid Assessment    Least pain over the last week has been 3/10. Worst pain over the last week has been 10/10. Opioid Risk Tool Reviewed: YES. The patient stopped drinking all al chol x 3 years. Aberrant behaviors: None. Urine Drug Screen: due - order placed. Controlled substance agreement on file: YES, updated today.     reviewed:yes  Pill count is consistent with his prescription: yes and n/a  Concomitant use of a benzodiazepine: no  MME: 15  Narcan not warrented    Also,  abstinence syndrome was reviewed and discussed with her today N/A    Risks and benefits of ongoing opiate therapy have been reviewed with the patient. No pain behaviors. Denies thoughts of harming self or others. Pt has a good risk to benefit ratio which allows the pt to function in a home environment without side effects    This is a chronic problem that is stable. Per review of available records and patients , there are not sign of overuse, misuse, diversion, or concerning side effects. Today we reviewed: the risk of overdose, addiction, and dependency  The following changes were made to the patients current treatment plan: nothing, medications refilled. We discussed the pain diagnosis affecting the back. Significant changes since the last visit include: None. With the medication, the patient is able to perform normal daily activities. ASSESSMENT  66 y.o. male with low back pain. Diagnoses and all orders for this visit:    1. Low back pain, episodic    2. Pseudoarthrosis of lumbar spine    3. S/P lumbar spinal fusion    4. Lumbar post-laminectomy syndrome       IMPRESSION/PLAN    1) Pt was given information on low back exercises. 2) Percocet refill today. He takes this medication very sparingly. We discussed if he will be needing higher or more frequent medications, we will refer him to PM.   3) UDS/ PA today. 4) Mr. Almas Jones has a reminder for a \"due or due soon\" health maintenance. I have asked that he contact his primary care provider, Colton Louise MD, for follow-up on this health maintenance. 5) We have informed patient to notify us for immediate appointment if he has any worsening neurogical symptoms or if an emergency situation presents, then call 911  6) Pt will follow-up in 3 months. Risks and benefits of ongoing opiate therapy have been reviewed with the patient.  is appropriate. UDS results have been consistent, last UDS today.  No pain behaviors. Denies thoughts of harming self or others. Pt has a good risk to benefit ratio which allows the pt to function in a home environment without side effects. PAST MEDICAL HISTORY  Past Medical History:   Diagnosis Date    Adverse effect of anesthesia 2012    nightmares in the past with ketamine, no problems with anes since    Anal fistula and Anal tag     CAD (coronary artery disease)     Diabetes ([address]     Emphysema     Follow-up examination following surgery     RLS (restless legs syndrome)     Unspecified adverse effect of anesthesia     Can not take Ketamine    Unspecified sleep apnea     no cpap        MEDICATIONS  Current Outpatient Prescriptions   Medication Sig Dispense Refill    oxyCODONE-acetaminophen (PERCOCET 10)  mg per tablet Take 1 Tab by mouth every eight (8) hours as needed for Pain. Max Daily Amount: 3 Tabs. 90 Tab 0    furosemide (LASIX) 40 mg tablet Take 40 mg by mouth daily.  rOPINIRole (REQUIP) 1 mg tablet Take 1 mg by mouth.  acetaminophen (TYLENOL) 325 mg tablet Take  by mouth every four (4) hours as needed for Pain.  SPIRIVA WITH HANDIHALER 18 mcg inhalation capsule Take 1 Cap by inhalation daily.  PROAIR HFA 90 mcg/actuation inhaler Take 1 Puff by inhalation.  amitriptyline (ELAVIL) 50 mg tablet TAKE ONE TABLET BY MOUTH ONCE DAILY AT  NIGHT 30 Tab 0    docusate sodium (COLACE) 100 mg capsule Take 100 mg by mouth two (2) times a day.  omeprazole (PRILOSEC) 20 mg capsule Take 20 mg by mouth daily.  ramipril (ALTACE) 2.5 mg capsule Take 2.5 mg by mouth daily.  carvedilol (COREG) 6.25 mg tablet Take 6.25 mg by mouth two (2) times daily (with meals).  simvastatin (ZOCOR) 20 mg tablet Take 20 mg by mouth nightly.  aspirin delayed-release 81 mg tablet Take 81 mg by mouth daily.  metFORMIN (GLUCOPHAGE) 1,000 mg tablet Take 1,000 mg by mouth two (2) times daily (with meals).       buPROPion XL (WELLBUTRIN XL) 150 mg tablet Take 150 mg by mouth every morning.  BUPROPION HCL (WELLBUTRIN PO) Take  by mouth two (2) times a day.  oxyCODONE-acetaminophen (PERCOCET) 5-325 mg per tablet Take 1 Tab by mouth every four (4) hours as needed for Pain. Max Daily Amount: 6 Tabs. 20 Tab 0    rOPINIRole (REQUIP) 0.25 mg tablet Take 0.25 mg by mouth.  furosemide (LASIX) 80 mg tablet Take  by mouth daily. ALLERGIES  Allergies   Allergen Reactions    Ativan [Lorazepam] Itching    Ketamine Drowsiness     Pt reports the drowsiness is excessive    Retaine Cmc [Carboxymethylcellulose Sodium] Other (comments)       SOCIAL HISTORY    Social History     Social History    Marital status:      Spouse name: N/A    Number of children: N/A    Years of education: N/A     Occupational History    Not on file. Social History Main Topics    Smoking status: Current Every Day Smoker    Smokeless tobacco: Never Used      Comment: 5 cigars daily     Alcohol use No    Drug use: No    Sexual activity: No     Other Topics Concern    Not on file     Social History Narrative       SUBJECTIVE      Pain Scale: 7/10    Pain Assessment  6/20/2017   Location of Pain Back   Pain Location Comment -   Location Modifiers -   Severity of Pain 4   Quality of Pain Aching   Quality of Pain Comment -   Duration of Pain -   Frequency of Pain Constant   Aggravating Factors Standing;Walking;Bending   Aggravating Factors Comment sitting   Limiting Behavior -   Relieving Factors (No Data)   Relieving Factors Comment pain med, laying flat   Result of Injury -       Accompanied by spouse. REVIEW OF SYSTEMS  ROS    Constitutional: Negative for fever, chills, or weight change. Respiratory: Negative for cough or shortness of breath. Cardiovascular: Negative for chest pain or palpitations. Gastrointestinal: Negative for acid reflux, change in bowel habits, or constipation.   Genitourinary: Negative for incontinence, dysuria and flank pain.   Musculoskeletal: Positive for low back pain. Skin: Negative for rash. Neurological: Negative for headaches, dizziness, or numbness. Endo/Heme/Allergies: Negative . Psychiatric/Behavioral: Negative. PHYSICAL EXAMINATION  Visit Vitals    /56    Pulse 72    Temp 98.1 °F (36.7 °C) (Oral)    Resp 18    Ht 5' 11\" (1.803 m)    Wt 268 lb (121.6 kg)    SpO2 92%    BMI 37.38 kg/m2       Constitutional: Well developed,  well nourished,  awake, alert, and in no acute distress. Neurological:  Sensation to light touch is intact. Psychiatric: Affect and mood are appropriate. Integumentary: No rashes or abrasions noted on exposed areas,  warm, dry and intact. Cardiovascular/Peripheral Vascular:  No peripheral edema is noted. Lymphatic:  No evidence of lymphedema. No cervical lymphadenopathy. SPINE/MUSCULOSKELETAL EXAM      Lumbar spine:  No rash, ecchymosis, or gross obliquity. No fasciculations. No focal atrophy is noted. Range of motion is intacy. No Tenderness to palpation . SI joints non-tender. Trochanters non tender. Musculoskeletal:  No pain with extension, axial loading, or forward flexion. No pain with internal or external rotation of his hips. MOTOR     Hip Flex  Quads Hamstrings Ankle DF EHL Ankle PF   Right +4/5 +4/5 +4/5 +4/5 +4/5 +4/5   Left +4/5 +4/5 +4/5 +4/5 +4/5 +4/5   Straight Leg raise negative bilaterally. normal gait and station    Ambulation without assistive device. full weight bearing, non-antalgic gait.     Amita Umana NP

## 2018-01-02 ENCOUNTER — OFFICE VISIT (OUTPATIENT)
Dept: ORTHOPEDIC SURGERY | Age: 73
End: 2018-01-02

## 2018-01-02 VITALS
DIASTOLIC BLOOD PRESSURE: 113 MMHG | WEIGHT: 272 LBS | OXYGEN SATURATION: 91 % | BODY MASS INDEX: 42.69 KG/M2 | SYSTOLIC BLOOD PRESSURE: 129 MMHG | RESPIRATION RATE: 16 BRPM | HEIGHT: 67 IN | TEMPERATURE: 98.2 F | HEART RATE: 71 BPM

## 2018-01-02 DIAGNOSIS — Z79.891 ENCOUNTER FOR LONG-TERM OPIATE ANALGESIC USE: ICD-10-CM

## 2018-01-02 DIAGNOSIS — M48.062 SPINAL STENOSIS OF LUMBAR REGION WITH NEUROGENIC CLAUDICATION: Primary | ICD-10-CM

## 2018-01-02 DIAGNOSIS — Z98.1 S/P LUMBAR SPINAL FUSION: ICD-10-CM

## 2018-01-02 PROBLEM — E11.21 TYPE 2 DIABETES MELLITUS WITH NEPHROPATHY (HCC): Status: ACTIVE | Noted: 2018-01-02

## 2018-01-02 PROBLEM — E66.01 OBESITY, MORBID (HCC): Status: ACTIVE | Noted: 2018-01-02

## 2018-01-02 RX ORDER — OXYCODONE AND ACETAMINOPHEN 10; 325 MG/1; MG/1
1 TABLET ORAL
Qty: 30 TAB | Refills: 0 | Status: SHIPPED | OUTPATIENT
Start: 2018-01-02 | End: 2018-01-15 | Stop reason: SDUPTHER

## 2018-01-02 NOTE — PROGRESS NOTES
[address]  [address], [address]  [place], [facility]  Phone: [phone]  Fax: [phone]    Isabella Solis  : 1945  PCP: Juan Daniel Pina MD    PROGRESS NOTE    HISTORY OF PRESENT ILLNESS:  Chief Complaint   Patient presents with    Back Pain     3 month follow up    Leg Pain     Danielle Rodriguez is a 66 y.o.  male with history of severe left- sided back pain that radiates into his flank. His pain is worse with sitting and weightbearing. Dr. Nathaly Nolen saw him in  and reviewed his MRI and CT. His spine was stable with no evidence of stenosis, instability or evidence of infection. He has a known perirectal abscess and this is where the pain is believed to be coming from. Dr. Nathaly Nolen had discussed the possibility of a SCS but with the known chronic abscess he was reluctant due to the possibility of infection. He saw nothing surgical.Today, he says his pain will flare intermittently. He continues with the low back and pain radiating to his bilateral anterior thighs to his feet. This is an achy, tingling sensation which progresses to numbness. He recently traveled to [place] and feels his pain is increased due to the drive. Denies bladder/bowel dysfunction, saddle paresthesia, weakness, gait disturbance, or other neurological deficit. States he has been using 1-2 percocet but this is not a daily occurance. He will take them with pain flares. This brings his pain to a manageable level where he can perform his daily activities. He takes on average 30 tabs a month. Pt at this time desires to continue with current care/proceed with medication evaluation. He continues to have the perirectal abscess and Dr. Laney Gutierrez is following him for this. Opioid Assessment     Least pain over the last week has been 3/10. Worst pain over the last week has been 10/10. Opioid Risk Tool Reviewed: YES. The patient stopped drinking all alcohol x 3 years.    Aberrant behaviors: None. Urine Drug Screen: due - order placed. Controlled substance agreement on file: YES, updated today.  reviewed:yes  Pill count is consistent with his prescription: yes and n/a  Concomitant use of a benzodiazepine: no  MME: 10-15   Also,  abstinence syndrome was reviewed and discussed with her today N/A     Risks and benefits of ongoing opiate therapy have been reviewed with the patient. No pain behaviors. Denies thoughts of harming self or others. Pt has a good risk to benefit ratio which allows the pt to function in a home environment without side effects     This is a chronic problem that is stable.  Per review of available records and patients , there are not sign of overuse, misuse, diversion, or concerning side effects. Today we reviewed: the risk of overdose, addiction, and dependency  The following changes were made to the patients current treatment plan: nothing, medications refilled.  We discussed the pain diagnosis affecting the back. Significant changes since the last visit include: None. With the medication, the patient is able to perform normal daily activities.        BP: nursing to re-check. Denies CP, SOB. Will FU with his PCP. ASSESSMENT  74 y.o. male with chronic lumbar pain. Diagnoses and all orders for this visit:    1. Spinal stenosis of lumbar region with neurogenic claudication  -     oxyCODONE-acetaminophen (PERCOCET 10)  mg per tablet; Take 1 Tab by mouth daily as needed for Pain. Indications: chronic pain    2. S/P lumbar spinal fusion  -     oxyCODONE-acetaminophen (PERCOCET 10)  mg per tablet; Take 1 Tab by mouth daily as needed for Pain. Indications: chronic pain    3. Encounter for long-term opiate analgesic use  -     DRUG SCREEN UR - W/ CONFIRM; Future       IMPRESSION/PLAN    1) Pt was given information on back care exercises. 2) Refill of Percocet today, 30 tabs/month.    3) UDS today, he will call for further refills and will pick this up at [facility]. 4) Mr. Cecily Castillo has a reminder for a \"due or due soon\" health maintenance. I have asked that he contact his primary care provider, Le Soria MD, for follow-up on this health maintenance. 5) We have informed patient to notify us for immediate appointment if he has any worsening neurogical symptoms or if an emergency situation presents, then call 911  6) Pt will follow-up in 3 months at [facility] w/ NP. Risks and benefits of ongoing opiate therapy have been reviewed with the patient.  is appropriate. UDS results have been consistent, last UDS 10/2017, UDS today. No pain behaviors. Denies thoughts of harming self or others. Pt has a good risk to benefit ratio which allows the pt to function in a home environment without side effects. PAST MEDICAL HISTORY  Past Medical History:   Diagnosis Date    Adverse effect of anesthesia 2012    nightmares in the past with ketamine, no problems with anes since    Anal fistula and Anal tag     CAD (coronary artery disease)     Diabetes ([facility] [address]     Emphysema     Follow-up examination following surgery     RLS (restless legs syndrome)     Unspecified adverse effect of anesthesia     Can not take Ketamine    Unspecified sleep apnea     no cpap        MEDICATIONS  Current Outpatient Prescriptions   Medication Sig Dispense Refill    oxyCODONE-acetaminophen (PERCOCET 10)  mg per tablet Take 1 Tab by mouth daily as needed for Pain. Indications: chronic pain 30 Tab 0    buPROPion XL (WELLBUTRIN XL) 150 mg tablet Take 150 mg by mouth every morning.  furosemide (LASIX) 40 mg tablet Take 40 mg by mouth daily.  rOPINIRole (REQUIP) 1 mg tablet Take 1 mg by mouth.  BUPROPION HCL (WELLBUTRIN PO) Take  by mouth two (2) times a day.  acetaminophen (TYLENOL) 325 mg tablet Take  by mouth every four (4) hours as needed for Pain.  SPIRIVA WITH HANDIHALER 18 mcg inhalation capsule Take 1 Cap by inhalation daily.       PROAIR HFA 90 mcg/actuation inhaler Take 1 Puff by inhalation.  amitriptyline (ELAVIL) 50 mg tablet TAKE ONE TABLET BY MOUTH ONCE DAILY AT  NIGHT 30 Tab 0    rOPINIRole (REQUIP) 0.25 mg tablet Take 0.25 mg by mouth.  docusate sodium (COLACE) 100 mg capsule Take 100 mg by mouth two (2) times a day.  omeprazole (PRILOSEC) 20 mg capsule Take 20 mg by mouth daily.  ramipril (ALTACE) 2.5 mg capsule Take 2.5 mg by mouth daily.  carvedilol (COREG) 6.25 mg tablet Take 6.25 mg by mouth two (2) times daily (with meals).  simvastatin (ZOCOR) 20 mg tablet Take 20 mg by mouth nightly.  aspirin delayed-release 81 mg tablet Take 81 mg by mouth daily.  furosemide (LASIX) 80 mg tablet Take  by mouth daily.  metFORMIN (GLUCOPHAGE) 1,000 mg tablet Take 1,000 mg by mouth two (2) times daily (with meals). ALLERGIES  Allergies   Allergen Reactions    Ativan [Lorazepam] Itching    Ketamine Drowsiness     Pt reports the drowsiness is excessive    Retaine Cmc [Carboxymethylcellulose Sodium] Other (comments)       SOCIAL HISTORY    Social History     Social History    Marital status:      Spouse name: N/A    Number of children: N/A    Years of education: N/A     Occupational History    Not on file. Social History Main Topics    Smoking status: Current Every Day Smoker     Packs/day: 2.00     Years: 54.00     Types: Cigarettes    Smokeless tobacco: Never Used      Comment: currently - 5 cigars daily now no cigarettes    Alcohol use 0.6 oz/week     1 Shots of liquor per week    Drug use: No    Sexual activity: No     Other Topics Concern    Not on file     Social History Narrative       SUBJECTIVE      Pain Scale: 7/10    Pain Assessment  1/2/2018   Location of Pain Back; Other (comment); Leg   Pain Location Comment lower back, both legs   Location Modifiers -   Severity of Pain -   Quality of Pain Aching   Quality of Pain Comment -   Duration of Pain Persistent Frequency of Pain Constant   Aggravating Factors Stairs; Walking;Standing;Squatting;Kneeling;Exercise;Straightening;Stretching;Bending   Aggravating Factors Comment -   Limiting Behavior -   Relieving Factors Other (Comment)   Relieving Factors Comment meds   Result of Injury No       Accompanied by spouse. REVIEW OF SYSTEMS  ROS    Constitutional: Negative for fever, chills, or weight change. Respiratory: Negative for cough or shortness of breath. Cardiovascular: Negative for chest pain or palpitations. Gastrointestinal: Negative for acid reflux, change in bowel habits, or constipation. Genitourinary: Negative for incontinence, dysuria and flank pain. Musculoskeletal: Positive for lumbar pain. Skin: Negative for rash. Neurological: Negative for headaches, dizziness, or numbness. Endo/Heme/Allergies: Negative . Psychiatric/Behavioral: Negative. PHYSICAL EXAMINATION  Visit Vitals    BP (!) 129/113    Pulse 71    Temp 98.2 °F (36.8 °C) (Oral)    Resp 16    Ht 5' 7\" (1.702 m)    Wt 272 lb (123.4 kg)    SpO2 91%    BMI 42.6 kg/m2       Constitutional: Well developed,  well nourished,  awake, alert, and in no acute distress. Neurological:  Sensation to light touch is intact. Psychiatric: Affect and mood are appropriate. Integumentary: No rashes or abrasions noted on exposed areas,  warm, dry and intact. Cardiovascular/Peripheral Vascular:  No peripheral edema is noted. Lymphatic:  No evidence of lymphedema. No cervical lymphadenopathy. SPINE/MUSCULOSKELETAL EXAM    Lumbar spine:  No rash, ecchymosis, or gross obliquity. No fasciculations. No focal atrophy is noted. Range of motion is intact. No Tenderness to palpation. SI joints non-tender. Trochanters non tender. Musculoskeletal:  No pain with extension, axial loading, or forward flexion. No pain with internal or external rotation of his hips.      MOTOR     Hip Flex  Quads Hamstrings Ankle DF EHL Ankle PF   Right +4/5 +4/5 +4/5 +4/5 +4/5 +4/5   Left +4/5 +4/5 +4/5 +4/5 +4/5 +4/5        Straight Leg raise negative bilaterally. normal gait and station    Ambulation with single point cane. full weight bearing, non-antalgic gait.     Dylon Lei, NP

## 2018-01-15 ENCOUNTER — TELEPHONE (OUTPATIENT)
Dept: ORTHOPEDIC SURGERY | Age: 73
End: 2018-01-15

## 2018-01-15 DIAGNOSIS — Z98.1 S/P LUMBAR SPINAL FUSION: ICD-10-CM

## 2018-01-15 DIAGNOSIS — M48.062 SPINAL STENOSIS OF LUMBAR REGION WITH NEUROGENIC CLAUDICATION: ICD-10-CM

## 2018-01-15 RX ORDER — OXYCODONE AND ACETAMINOPHEN 10; 325 MG/1; MG/1
1 TABLET ORAL
Qty: 30 TAB | Refills: 0 | Status: SHIPPED | OUTPATIENT
Start: 2018-03-02 | End: 2018-05-29 | Stop reason: SDUPTHER

## 2018-01-15 RX ORDER — OXYCODONE AND ACETAMINOPHEN 10; 325 MG/1; MG/1
1 TABLET ORAL
Qty: 30 TAB | Refills: 0 | Status: SHIPPED | OUTPATIENT
Start: 2018-02-01 | End: 2018-04-02 | Stop reason: SDUPTHER

## 2018-01-15 NOTE — TELEPHONE ENCOUNTER
PATIENT WOULD LIKE TO  HIS PERCOCET 10-325MG FOR FEBRUARY ON 1/25/18 AT [facility].

## 2018-01-16 ENCOUNTER — DOCUMENTATION ONLY (OUTPATIENT)
Dept: ORTHOPEDIC SURGERY | Age: 73
End: 2018-01-16

## 2018-01-16 NOTE — PROGRESS NOTES
Rx signed, will bring to [facility] this afternoon. Will be ready for  as early as tomorrow morning.

## 2018-01-16 NOTE — PROGRESS NOTES
Called patient to inform. Reached a message stating \"I'm sorry, but the person you are calling has a voicemail box that has not been set up yet, please try your call again later, goodbye. \"    Unable to reach patient, unable to leave message.

## 2018-01-18 NOTE — PROGRESS NOTES
Called patient, verified , informed of Rx being ready for  at the  of our [facility] location in [address] [place] [address]. Patient verbalized agreement/understanding. No further action required at this time.

## 2018-04-02 ENCOUNTER — OFFICE VISIT (OUTPATIENT)
Dept: ORTHOPEDIC SURGERY | Age: 73
End: 2018-04-02

## 2018-04-02 VITALS
BODY MASS INDEX: 38.86 KG/M2 | SYSTOLIC BLOOD PRESSURE: 131 MMHG | HEART RATE: 83 BPM | HEIGHT: 71 IN | OXYGEN SATURATION: 92 % | DIASTOLIC BLOOD PRESSURE: 66 MMHG | WEIGHT: 277.6 LBS

## 2018-04-02 DIAGNOSIS — Z79.891 ENCOUNTER FOR LONG-TERM OPIATE ANALGESIC USE: ICD-10-CM

## 2018-04-02 DIAGNOSIS — M48.062 SPINAL STENOSIS OF LUMBAR REGION WITH NEUROGENIC CLAUDICATION: ICD-10-CM

## 2018-04-02 DIAGNOSIS — M54.50 LOW BACK PAIN, EPISODIC: Primary | ICD-10-CM

## 2018-04-02 DIAGNOSIS — Z98.1 S/P LUMBAR SPINAL FUSION: ICD-10-CM

## 2018-04-02 DIAGNOSIS — S32.009K PSEUDOARTHROSIS OF LUMBAR SPINE: ICD-10-CM

## 2018-04-02 RX ORDER — OXYCODONE AND ACETAMINOPHEN 10; 325 MG/1; MG/1
1 TABLET ORAL
Qty: 30 TAB | Refills: 0 | Status: SHIPPED | OUTPATIENT
Start: 2018-04-02 | End: 2018-07-23 | Stop reason: SDUPTHER

## 2018-04-02 RX ORDER — OXYCODONE AND ACETAMINOPHEN 10; 325 MG/1; MG/1
TABLET ORAL
Qty: 45 TAB | Refills: 0 | Status: SHIPPED | OUTPATIENT
Start: 2018-05-02 | End: 2018-07-23 | Stop reason: SDUPTHER

## 2018-04-02 NOTE — PATIENT INSTRUCTIONS
Learning About How to Have a Healthy Back  What causes back pain? Back pain is often caused by overuse, strain, or injury. For example, people often hurt their backs playing sports or working in the yard, being jolted in a car accident, or lifting something too heavy. Aging plays a part too. Your bones and muscles tend to lose strength as you age, which makes injury more likely. The spongy discs between the bones of the spine (vertebrae) may suffer from wear and tear and no longer provide enough cushion between the bones. A disc that bulges or breaks open (herniated disc) can press on nerves, causing back pain. In some people, back pain is the result of arthritis, broken vertebrae caused by bone loss (osteoporosis), illness, or a spine problem. Although most people have back pain at one time or another, there are steps you can take to make it less likely. How can you have a healthy back? Reduce stress on your back through good posture  Slumping or slouching alone may not cause low back pain. But after the back has been strained or injured, bad posture can make pain worse. · Sleep in a position that maintains your back's normal curves and on a mattress that feels comfortable. Sleep on your side with a pillow between your knees, or sleep on your back with a pillow under your knees. These positions can reduce strain on your back. · Stand and sit up straight. \"Good posture\" generally means your ears, shoulders, and hips are in a straight line. · If you must stand for a long time, put one foot on a stool, ledge, or box. Switch feet every now and then. · Sit in a chair that is low enough to let you place both feet flat on the floor with both knees nearly level with your hips. If your chair or desk is too high, use a footrest to raise your knees. Place a small pillow, a rolled-up towel, or a lumbar roll in the curve of your back if you need extra support.   · Try a kneeling chair, which helps tilt your hips forward. This takes pressure off your lower back. · Try sitting on an exercise ball. It can rock from side to side, which helps keep your back loose. · When driving, keep your knees nearly level with your hips. Sit straight, and drive with both hands on the steering wheel. Your arms should be in a slightly bent position. Reduce stress on your back through careful lifting  · Squat down, bending at the hips and knees only. If you need to, put one knee to the floor and extend your other knee in front of you, bent at a right angle (half kneeling). · Press your chest straight forward. This helps keep your upper back straight while keeping a slight arch in your low back. · Hold the load as close to your body as possible, at the level of your belly button (navel). · Use your feet to change direction, taking small steps. · Lead with your hips as you change direction. Keep your shoulders in line with your hips as you move. · Set down your load carefully, squatting with your knees and hips only. Exercise and stretch your back  · Do some exercise on most days of the week, if your doctor says it is okay. You can walk, run, swim, or cycle. · Stretch your back muscles. Here are a few exercises to try:  Gareld Jensen on your back, and gently pull one bent knee to your chest. Put that foot back on the floor, and then pull the other knee to your chest.  ¨ Do pelvic tilts. Lie on your back with your knees bent. Tighten your stomach muscles. Pull your belly button (navel) in and up toward your ribs. You should feel like your back is pressing to the floor and your hips and pelvis are slightly lifting off the floor. Hold for 6 seconds while breathing smoothly. ¨ Sit with your back flat against a wall. · Keep your core muscles strong. The muscles of your back, belly (abdomen), and buttocks support your spine. ¨ Pull in your belly and imagine pulling your navel toward your spine. Hold this for 6 seconds, then relax.  Remember to keep breathing normally as you tense your muscles. ¨ Do curl-ups. Always do them with your knees bent. Keep your low back on the floor, and curl your shoulders toward your knees using a smooth, slow motion. Keep your arms folded across your chest. If this bothers your neck, try putting your hands behind your neck (not your head), with your elbows spread apart. ¨ Lie on your back with your knees bent and your feet flat on the floor. Tighten your belly muscles, and then push with your feet and raise your buttocks up a few inches. Hold this position 6 seconds as you continue to breathe normally, then lower yourself slowly to the floor. Repeat 8 to 12 times. ¨ If you like group exercise, try Pilates or yoga. These classes have poses that strengthen the core muscles. Lead a healthy lifestyle  · Stay at a healthy weight to avoid strain on your back. · Do not smoke. Smoking increases the risk of osteoporosis, which weakens the spine. If you need help quitting, talk to your doctor about stop-smoking programs and medicines. These can increase your chances of quitting for good. Where can you learn more? Go to http://azalia-chloé.info/. Enter L315 in the search box to learn more about \"Learning About How to Have a Healthy Back. \"  Current as of: March 21, 2017  Content Version: 11.4  © 5655-2491 [employer]. Care instructions adapted under license by [employer] (which disclaims liability or warranty for this information). If you have questions about a medical condition or this instruction, always ask your healthcare professional. [place] [address] any warranty or liability for your use of this information.

## 2018-04-02 NOTE — PROGRESS NOTES
[address]  [address], [address]  [place], [facility] [place]note 2] [address]  Phone: [phone]  Fax: [phone]    Kvng Kulkarni  : 1945  PCP: Christine Hylton MD    PROGRESS NOTE    HISTORY OF PRESENT ILLNESS:  Chief Complaint   Patient presents with    Back Pain     lumbar   Marcus Lai is a 66 y. o.  male with history of severe left- sided back pain that radiates into his flank. His pain is worse with sitting and weightbearing. Dr. Janusz Ibarra saw him in  and reviewed his MRI and CT. His spine was stable with no evidence of stenosis, instability or evidence of infection. He has a known perirectal abscess and this is where the pain is believed to be coming from. Dr. Janusz Ibarra had discussed the possibility of a SCS but with the known chronic abscess he was reluctant due to the possibility of infection. He saw nothing surgical. At his last visit, he was having low back pain and bilateral anterior thigh to his feet pain. Today, he continues with the low back pain and thigh pain. He is getting ready to travel to [place] in May. Denies bladder/bowel dysfunction, saddle paresthesia, weakness, gait disturbance, or other neurological deficit. States he has been using 1-2 percocet but this is not a daily occurance. He will take them with pain flares. This brings his pain to a manageable level where he can perform his daily activities. [facility] takes on average 30 tabs a month. He took 1 tablet last night. This was the first dose in several days. Pt at this time desires to continue with current care/proceed with medication evaluation. He continues to have the perirectal abscess and Dr. Twila Falk is following him for this.      Opioid Assessment      Least pain over the last week has been 3/10. Worst pain over the last week has been 10/10. Opioid Risk Tool Reviewed: YES. The patient stopped drinking all alcohol x 3 years.    Aberrant behaviors: None.    Urine Drug Screen: due - order placed. Controlled substance agreement on file: YES, updated today.    reviewed:yes  Pill count is consistent with his prescription: yes and n/a  Concomitant use of a benzodiazepine: no  MME: 10-15   Also,  abstinence syndrome was reviewed and discussed with her today N/A      Risks and benefits of ongoing opiate therapy have been reviewed with the patient.  No pain behaviors. Denies thoughts of harming self or others. Pt has a good risk to benefit ratio which allows the pt to function in a home environment without side effects      This is a chronic problem that is stable.  Per review of available records and patients , there are not sign of overuse, misuse, diversion, or concerning side effects. Today we reviewed: the risk of overdose, addiction, and dependency  The following changes were made to the patients current treatment plan: nothing, medications refilled.  We discussed the pain diagnosis affecting the back. Significant changes since the last visit include: None.  With the medication, the patient is able to perform normal daily activities.      ASSESSMENT  74 y.o. male with lumbar pain. Diagnoses and all orders for this visit:    1. Low back pain, episodic    2. Pseudoarthrosis of lumbar spine    3. Spinal stenosis of lumbar region with neurogenic claudication  -     oxyCODONE-acetaminophen (PERCOCET 10)  mg per tablet; Daily to twice daily as needed  Indications: Pain, chronic pain  -     oxyCODONE-acetaminophen (PERCOCET 10)  mg per tablet; Take 1 Tab by mouth daily as needed for Pain. Indications: chronic pain    4. S/P lumbar spinal fusion  -     oxyCODONE-acetaminophen (PERCOCET 10)  mg per tablet; Daily to twice daily as needed  Indications: Pain, chronic pain  -     oxyCODONE-acetaminophen (PERCOCET 10)  mg per tablet; Take 1 Tab by mouth daily as needed for Pain. Indications: chronic pain    5.  Encounter for long-term opiate analgesic use  -     DRUG SCREEN UR - W/ CONFIRM; Future       IMPRESSION/PLAN    1) Pt was given information on back care. 2) UDS today. 3) Refill on Percocet. We will temporarily increase his May dose to 45 tablets monthly as he is traveling to [place]. He will call for his June rx. This will be returned to 30 tablets monthly. 4) Mr. Cathy Valdez has a reminder for a \"due or due soon\" health maintenance. I have asked that he contact his primary care provider, Africa Avelar MD, for follow-up on this health maintenance. 5) We have informed patient to notify us for immediate appointment if he has any worsening neurogical symptoms or if an emergency situation presents, then call 911  6) Pt will follow-up in 3 months. Risks and benefits of ongoing opiate therapy have been reviewed with the patient.  is appropriate. UDS results have been consistent, last UDS 1/2018. No pain behaviors. Denies thoughts of harming self or others. Pt has a good risk to benefit ratio which allows the pt to function in a home environment without side effects. PAST MEDICAL HISTORY  Past Medical History:   Diagnosis Date    Adverse effect of anesthesia 2012    nightmares in the past with ketamine, no problems with anes since    Anal fistula and Anal tag     CAD (coronary artery disease)     Diabetes ([facility] [address]     Emphysema     Follow-up examination following surgery     RLS (restless legs syndrome)     Unspecified adverse effect of anesthesia     Can not take Ketamine    Unspecified sleep apnea     no cpap        MEDICATIONS  Current Outpatient Prescriptions   Medication Sig Dispense Refill    [START ON 5/2/2018] oxyCODONE-acetaminophen (PERCOCET 10)  mg per tablet Daily to twice daily as needed  Indications: Pain, chronic pain 45 Tab 0    oxyCODONE-acetaminophen (PERCOCET 10)  mg per tablet Take 1 Tab by mouth daily as needed for Pain.  Indications: chronic pain 30 Tab 0    buPROPion XL (WELLBUTRIN XL) 150 mg tablet Take 150 mg by mouth every morning.  furosemide (LASIX) 40 mg tablet Take 40 mg by mouth daily.  rOPINIRole (REQUIP) 1 mg tablet Take 1 mg by mouth.  BUPROPION HCL (WELLBUTRIN PO) Take  by mouth two (2) times a day.  acetaminophen (TYLENOL) 325 mg tablet Take  by mouth every four (4) hours as needed for Pain.  SPIRIVA WITH HANDIHALER 18 mcg inhalation capsule Take 1 Cap by inhalation daily.  PROAIR HFA 90 mcg/actuation inhaler Take 1 Puff by inhalation.  amitriptyline (ELAVIL) 50 mg tablet TAKE ONE TABLET BY MOUTH ONCE DAILY AT  NIGHT 30 Tab 0    rOPINIRole (REQUIP) 0.25 mg tablet Take 0.25 mg by mouth.  docusate sodium (COLACE) 100 mg capsule Take 100 mg by mouth two (2) times a day.  omeprazole (PRILOSEC) 20 mg capsule Take 20 mg by mouth daily.  ramipril (ALTACE) 2.5 mg capsule Take 2.5 mg by mouth daily.  carvedilol (COREG) 6.25 mg tablet Take 6.25 mg by mouth two (2) times daily (with meals).  simvastatin (ZOCOR) 20 mg tablet Take 20 mg by mouth nightly.  aspirin delayed-release 81 mg tablet Take 81 mg by mouth daily.  furosemide (LASIX) 80 mg tablet Take  by mouth daily.  metFORMIN (GLUCOPHAGE) 1,000 mg tablet Take 1,000 mg by mouth two (2) times daily (with meals).  oxyCODONE-acetaminophen (PERCOCET 10)  mg per tablet Take 1 Tab by mouth daily as needed for Pain. Indications: Pain, chronic pain 30 Tab 0       ALLERGIES  Allergies   Allergen Reactions    Ativan [Lorazepam] Itching    Ketamine Drowsiness     Pt reports the drowsiness is excessive    Retaine Cmc [Carboxymethylcellulose Sodium] Other (comments)       SOCIAL HISTORY    Social History     Social History    Marital status:      Spouse name: N/A    Number of children: N/A    Years of education: N/A     Occupational History    Not on file.      Social History Main Topics    Smoking status: Current Every Day Smoker     Packs/day: 2.00     Years: 54.00     Types: Cigarettes    Smokeless tobacco: Never Used      Comment: currently - 5 cigars daily now no cigarettes    Alcohol use 0.6 oz/week     1 Shots of liquor per week    Drug use: No    Sexual activity: No     Other Topics Concern    Not on file     Social History Narrative       SUBJECTIVE      Pain Scale: 6/10    Pain Assessment  4/2/2018   Location of Pain Back   Pain Location Comment -   Location Modifiers Inferior   Severity of Pain 6   Quality of Pain Aching   Quality of Pain Comment -   Duration of Pain Persistent   Frequency of Pain Constant   Aggravating Factors Standing   Aggravating Factors Comment -   Limiting Behavior Yes   Relieving Factors Nothing   Relieving Factors Comment -   Result of Injury -       Accompanied by spouse. REVIEW OF SYSTEMS  ROS    Constitutional: Negative for fever, chills, or weight change. Respiratory: Negative for cough or shortness of breath. Cardiovascular: Negative for chest pain or palpitations. Gastrointestinal: Negative for acid reflux, change in bowel habits, or constipation. Genitourinary: Negative for incontinence, dysuria and flank pain. Musculoskeletal: Positive for lumbar pain. Skin: Negative for rash. Neurological: Negative for headaches, dizziness, or numbness. Endo/Heme/Allergies: Negative . Psychiatric/Behavioral: Negative. PHYSICAL EXAMINATION  Visit Vitals    /66 (BP 1 Location: Left arm, BP Patient Position: Sitting)    Pulse 83    Ht 5' 11\" (1.803 m)    Wt 277 lb 9.6 oz (125.9 kg)    SpO2 92%    BMI 38.72 kg/m2       Constitutional: Well developed,  well nourished,  awake, alert, and in no acute distress. Neurological:  Sensation to light touch is intact. Psychiatric: Affect and mood are appropriate. Integumentary: No rashes or abrasions noted on exposed areas,  warm, dry and intact. Cardiovascular/Peripheral Vascular:  No peripheral edema is noted. Lymphatic:  No evidence of lymphedema. No cervical lymphadenopathy. SPINE/MUSCULOSKELETAL EXAM  Lumbar spine:  No rash, ecchymosis, or gross obliquity. No fasciculations. No focal atrophy is noted. Range of motion is intact. No Tenderness to palpation. SI joints non-tender. Trochanters non tender. Musculoskeletal:  No pain with extension, axial loading, or forward flexion. No pain with internal or external rotation of his hips. MOTOR     Hip Flex  Quads Hamstrings Ankle DF EHL Ankle PF   Right +4/5 +4/5 +4/5 +4/5 +4/5 +4/5   Left +4/5 +4/5 +4/5 +4/5 +4/5 +4/5   Straight Leg raise negative bilaterally. normal gait and station    Ambulation without assistive device. full weight bearing, non-antalgic gait.     Basilia Frances, NP

## 2018-05-29 ENCOUNTER — TELEPHONE (OUTPATIENT)
Dept: ORTHOPEDIC SURGERY | Age: 73
End: 2018-05-29

## 2018-05-29 DIAGNOSIS — Z98.1 S/P LUMBAR SPINAL FUSION: ICD-10-CM

## 2018-05-29 DIAGNOSIS — M48.062 SPINAL STENOSIS OF LUMBAR REGION WITH NEUROGENIC CLAUDICATION: ICD-10-CM

## 2018-05-29 RX ORDER — OXYCODONE AND ACETAMINOPHEN 10; 325 MG/1; MG/1
1 TABLET ORAL
Qty: 30 TAB | Refills: 0 | Status: SHIPPED | OUTPATIENT
Start: 2018-06-02 | End: 2018-07-09 | Stop reason: SDUPTHER

## 2018-05-29 NOTE — TELEPHONE ENCOUNTER
Patients emergency contact Larisa Gutierrez called in (not on Hipaa)  states that the patient is in [facility] and will be back on June 2 to get his Rx that Will Pearson called to inform him would be ready at the [facility].

## 2018-05-29 NOTE — TELEPHONE ENCOUNTER
Returned call to patient, reached wife \"Angela\", per HIPAA form on 2018, no one is listed to discuss patient information. Informed \"Angela\" of above, phone provided to patient, verified , informed patient Rx was ready for  at our [facility] location. Patient will schedule appointment when he picks up Rx, as patient is in [facility] at this time. No further action required at this time.

## 2018-06-14 ENCOUNTER — HOSPITAL ENCOUNTER (OUTPATIENT)
Dept: LAB | Age: 73
Discharge: HOME | End: 2018-06-14
Payer: MEDICARE

## 2018-06-14 ENCOUNTER — OFFICE VISIT (OUTPATIENT)
Dept: UROLOGY | Age: 73
End: 2018-06-14

## 2018-06-14 VITALS
HEIGHT: 71 IN | OXYGEN SATURATION: 93 % | SYSTOLIC BLOOD PRESSURE: 122 MMHG | HEART RATE: 74 BPM | DIASTOLIC BLOOD PRESSURE: 64 MMHG

## 2018-06-14 DIAGNOSIS — N40.1 BPH WITH URINARY OBSTRUCTION: ICD-10-CM

## 2018-06-14 DIAGNOSIS — R33.9 RETENTION, URINE: Primary | ICD-10-CM

## 2018-06-14 DIAGNOSIS — N13.8 BPH WITH URINARY OBSTRUCTION: ICD-10-CM

## 2018-06-14 DIAGNOSIS — R10.9 LEFT FLANK PAIN: ICD-10-CM

## 2018-06-14 LAB
BILIRUB UR QL STRIP: NEGATIVE
GLUCOSE UR-MCNC: NEGATIVE MG/DL
KETONES P FAST UR STRIP-MCNC: NEGATIVE MG/DL
PH UR STRIP: 6 [PH] (ref 4.6–8)
PROT UR QL STRIP: NEGATIVE
PSA SERPL-MCNC: 0.8 NG/ML (ref 0–4)
SP GR UR STRIP: 1.01 (ref 1–1.03)
UA UROBILINOGEN AMB POC: NORMAL (ref 0.2–1)
URINALYSIS CLARITY POC: CLEAR
URINALYSIS COLOR POC: YELLOW
URINE BLOOD POC: NORMAL
URINE LEUKOCYTES POC: NORMAL
URINE NITRITES POC: NEGATIVE

## 2018-06-14 PROCEDURE — 84153 ASSAY OF PSA TOTAL: CPT | Performed by: UROLOGY

## 2018-06-14 RX ORDER — TAMSULOSIN HYDROCHLORIDE 0.4 MG/1
0.4 CAPSULE ORAL DAILY
Qty: 90 CAP | Refills: 3 | Status: SHIPPED | OUTPATIENT
Start: 2018-06-14

## 2018-06-14 NOTE — MR AVS SNAPSHOT
[address] [facility] [address] [address] 
[phone] Patient: Karyle Lundborg MRN: [id number] :1945 Visit Information Date & Time Provider Department Dept. Phone Encounter #  
 2018  1:15 PM Yan Eduardo [place] [facility] [phone] [id number] Your Appointments 2018 10:30 AM  
Follow Up with Damien Junior NP  
[facility] [place] [address] [place] [address]) Appt Note: LOWER BACK APPT PER [address] [address]  
  
    
 2018  1:30 PM  
ULTRASOUND with Nader Snow MD  
[facility] [address] [facility]) Appt Note: PVR/PSA  
 [address] [address]  
[phone] [address] [facility] [address] [place] [id number] Upcoming Health Maintenance Date Due HEMOGLOBIN A1C Q6M 1945 LIPID PANEL Q1 1945 FOOT EXAM Q1 10/8/1953 MICROALBUMIN Q1 10/8/1953 EYE EXAM RETINAL OR DILATED Q1 10/8/1953 DTaP/Tdap/Td series (1 - Tdap) 10/8/1964 FOBT Q 1 YEAR AGE 50-75 10/8/1993 ZOSTER VACCINE AGE 60> 2003 GLAUCOMA SCREENING Q2Y 10/8/2008 Pneumococcal 65+ Low/Medium Risk (1 of 2 - PCV13) 10/8/2008 MEDICARE YEARLY EXAM 3/14/2018 Influenza Age 5 to Adult 2018 Allergies as of 2018  Review Complete On: 2018 By: Nader Snow MD  
  
 Severity Noted Reaction Type Reactions Ativan [Lorazepam]  2017    Itching Ketamine  2014    Drowsiness Other reaction(s): psychological reaction Pt reports the drowsiness is excessive Retaine Cmc [Carboxymethylcellulose Sodium]    Other (comments) Current Immunizations  Never Reviewed No immunizations on file. Not reviewed this visit You Were Diagnosed With   
  
 Codes Comments Retention, urine    -  Primary ICD-10-CM: R33.9 ICD-9-CM: 788.20 Left flank pain     ICD-10-CM: R10.9 ICD-9-CM: 789.09   
 BPH with urinary obstruction     ICD-10-CM: N40.1, N13.8 ICD-9-CM: 600.01, 599.69 Vitals BP Pulse Height(growth percentile) SpO2 Smoking Status 122/64 (BP 1 Location: Left arm, BP Patient Position: Sitting) 74 5' 11\" (1.803 m) 93% Current Every Day Smoker Vitals History Preferred Pharmacy Pharmacy Name Phone [address], [address] [place] [phone] Your Updated Medication List  
  
   
This list is accurate as of 6/14/18  1:33 PM.  Always use your most recent med list. ALTACE 2.5 mg capsule Generic drug:  ramipril Take 2.5 mg by mouth daily. amitriptyline 50 mg tablet Commonly known as:  ELAVIL TAKE ONE TABLET BY MOUTH ONCE DAILY AT  NIGHT  
  
 aspirin delayed-release 81 mg tablet Take 81 mg by mouth daily. COLACE 100 mg capsule Generic drug:  docusate sodium Take 100 mg by mouth two (2) times a day. COREG 6.25 mg tablet Generic drug:  carvedilol Take 6.25 mg by mouth two (2) times daily (with meals). * furosemide 40 mg tablet Commonly known as:  LASIX Take 40 mg by mouth daily. * furosemide 80 mg tablet Commonly known as:  LASIX Take  by mouth daily. metFORMIN 1,000 mg tablet Commonly known as:  GLUCOPHAGE Take 1,000 mg by mouth two (2) times daily (with meals). omeprazole 20 mg capsule Commonly known as:  PRILOSEC Take 20 mg by mouth daily. * oxyCODONE-acetaminophen  mg per tablet Commonly known as:  PERCOCET 10 Take 1 Tab by mouth daily as needed for Pain. Indications: chronic pain * oxyCODONE-acetaminophen  mg per tablet Commonly known as:  PERCOCET 10 Daily to twice daily as needed  Indications: Pain, chronic pain * oxyCODONE-acetaminophen  mg per tablet Commonly known as:  PERCOCET 10 Take 1 Tab by mouth daily as needed for Pain. Indications: Pain, chronic pain PROAIR HFA 90 mcg/actuation inhaler Generic drug:  albuterol Take 1 Puff by inhalation. * rOPINIRole 0.25 mg tablet Commonly known as:  [place] Take 0.25 mg by mouth. * rOPINIRole 1 mg tablet Commonly known as:  [place] Take 1 mg by mouth. SPIRIVA WITH HANDIHALER 18 mcg inhalation capsule Generic drug:  tiotropium Take 1 Cap by inhalation daily. TYLENOL 325 mg tablet Generic drug:  acetaminophen Take  by mouth every four (4) hours as needed for Pain. * WELLBUTRIN PO Take  by mouth two (2) times a day. * buPROPion  mg tablet Commonly known as:  Theodor Care Take 150 mg by mouth every morning. ZOCOR 20 mg tablet Generic drug:  simvastatin Take 20 mg by mouth nightly. * Notice: This list has 9 medication(s) that are the same as other medications prescribed for you. Read the directions carefully, and ask your doctor or other care provider to review them with you. We Performed the Following AMB POC URINALYSIS DIP STICK AUTO W/O MICRO [77817 CPT(R)] Patient Instructions Urinary Retention: Care Instructions Your Care Instructions Urinary retention means that you aren't able to urinate. In men, it is often caused by a blockage of the urinary tract from an enlarged prostate gland. In men and women, it can also be caused by an infection or nerve damage. Or it may be a side effect of a medicine. The doctor may have drained the urine from your bladder. If you still have problems passing urine, you may need to use a catheter at home. This is used to empty your bladder until the problem can be fixed. Your doctor may put a catheter in your bladder before you go home. If so, he or she will tell you when to come back to have the catheter removed. The doctor has checked you closely. But problems can develop later. If you notice any problems or new symptoms, get medical treatment right away. Follow-up care is a key part of your treatment and safety. Be sure to make and go to all appointments, and call your doctor if you are having problems. It's also a good idea to know your test results and keep a list of the medicines you take. How can you care for yourself at home? · Take your medicines exactly as prescribed. Call your doctor if you think you are having a problem with your medicine. You will get more details on the specific medicines your doctor prescribes. · Check with your doctor before you use any over-the-counter medicines. Many cold and allergy medicines, for example, can make this problem worse. Make sure your doctor knows all of the medicines, vitamins, supplements, and herbal remedies you take. · Spread out through the day the amount of fluid you drink. Do not drink a lot at bedtime. · Avoid alcohol and caffeine. · If you have been given a catheter, or if one is already in place, follow the instructions you were given. Always wash your hands before and after you handle the catheter. When should you call for help? Call your doctor now or seek immediate medical care if: 
? · You cannot urinate at all, or it is getting harder to urinate. ? · You have symptoms of a urinary tract infection. These may include: 
¨ Pain or burning when you urinate. ¨ A frequent need to urinate without being able to pass much urine. ¨ Pain in the flank, which is just below the rib cage and above the waist on either side of the back. ¨ Blood in your urine. ¨ A fever. ? Watch closely for changes in your health, and be sure to contact your doctor if: 
? · You have any problems with your catheter. ? · You do not get better as expected. Where can you learn more? Go to http://azalia-chloé.info/. Enter M244 in the search box to learn more about \"Urinary Retention: Care Instructions. \" Current as of: May 12, 2017 Content Version: 11.4 © 6875-6810 Healthwise, [place]. Care instructions adapted under license by [employer] (which disclaims liability or warranty for this information). If you have questions about a medical condition or this instruction, always ask your healthcare professional. [address] any warranty or liability for your use of this information. Introducing [facility]! Anne-Marie Mckeon introduces MDdat[employer]or patient portal. Now you can access parts of your medical record, email your doctor's office, and request medication refills online. 1. In your internet browser, go to [email or web address] 2. Click on the First Time User? Click Here link in the Sign In box. You will see the New Member Sign Up page. 3. Enter your MDdat[employer]or Access Code exactly as it appears below. You will not need to use this code after youve completed the sign-up process. If you do not sign up before the expiration date, you must request a new code. · MDdat[employer] Access Code: [email or web address] Expires: 6/27/2018  2:21 PM 
 
4. Enter the last four digits of your Social Security Number (xxxx) and Date of Birth (mm/dd/yyyy) as indicated and click Submit. You will be taken to the next sign-up page. 5. Create a MDdatacor ID. This will be your MDdat[employer]or login ID and cannot be changed, so think of one that is secure and easy to remember. 6. Create a MDdat[employer]or password. You can change your password at any time. 7. Enter your Password Reset Question and Answer. This can be used at a later time if you forget your password. 8. Enter your e-mail address. You will receive e-mail notification when new information is available in [address] 9. Click Sign Up. You can now view and download portions of your medical record. 10. Click the Download Summary menu link to download a portable copy of your medical information. If you have questions, please visit the Frequently Asked Questions section of the Urjanet website. Remember, Urjanet is NOT to be used for urgent needs. For medical emergencies, dial 911. Now available from your iPhone and Android! Please provide this summary of care documentation to your next provider. Your primary care clinician is listed as Rachel Love. If you have any questions after today's visit, please call [phone].

## 2018-06-14 NOTE — PATIENT INSTRUCTIONS

## 2018-06-14 NOTE — PROGRESS NOTES
Mr. Yue Davis has a reminder for a \"due or due soon\" health maintenance. I have asked that he contact his primary care provider for follow-up on this health maintenance. RBV Per  draw lab today for PSA for BPH.

## 2018-06-15 NOTE — PROGRESS NOTES
Marcus Lai 74 y.o. male     Mr. Greg Lund seen today for annual prostate evaluation status post TURP 2016 relieving irritative and obstructive voiding symptoms  Patient is voiding with a solid stream good control nocturia once or twice per night-    Very happy with functional result of TURP-\" urinating like a teenager\"     voiding with a forceful stream and good control but has had no favorable improvement in urgency or frequency still getting up 3 times per night and experiencing almost hourly daytime urgency and frequency  Voiding na clear urine at this time          Patient's only complaint this time is chronic low back pain          irritable bladder symptoms prior to TURP not responding favorably to alpha-blocker treatment-  symptomatic BPH with history of chronic prostatitis treated with periodic 3 week courses of Cipro antibiotic therapy     Cystoscopy 28 June 2016 showed normal urothelium with unobstructed prostate channel      PSA 0.8 in May 2016  PSA is 0.5 in August 2017    PVR 60 cc in June 2018      Patient has extensive history of perirectal abscess since 2014 requiring numerous drainage procedures under general anesthesia-currently managed by maintenance indwelling rubber drain      Review of Systems:    CNS: No seizure syncope headaches or dizziness no visual changes  Respiratory: No wheezing or shortness of breath  Cardiovascular:Coronary artery disease status post CABG 2014/Hypertension  Intestinal:No dyspepsia diarrhea or constipation/perirectal abscess with chronic perianal seton drainage  Urinary:Irritative voiding symptoms    Skeletal: No bone or joint pain  Endocrine:Diabetes  Other:  Allergies:    Allergies   Allergen Reactions    Ativan [Lorazepam] Itching    Ketamine Drowsiness     Other reaction(s): psychological reaction  Pt reports the drowsiness is excessive    Retaine Cmc [Carboxymethylcellulose Sodium] Other (comments)      Medications:    Current Outpatient Prescriptions Medication Sig Dispense Refill    oxyCODONE-acetaminophen (PERCOCET 10)  mg per tablet Take 1 Tab by mouth daily as needed for Pain. Indications: Pain, chronic pain 30 Tab 0    oxyCODONE-acetaminophen (PERCOCET 10)  mg per tablet Daily to twice daily as needed  Indications: Pain, chronic pain 45 Tab 0    oxyCODONE-acetaminophen (PERCOCET 10)  mg per tablet Take 1 Tab by mouth daily as needed for Pain. Indications: chronic pain 30 Tab 0    buPROPion XL (WELLBUTRIN XL) 150 mg tablet Take 150 mg by mouth every morning.  furosemide (LASIX) 40 mg tablet Take 40 mg by mouth daily.  rOPINIRole (REQUIP) 1 mg tablet Take 1 mg by mouth.  BUPROPION HCL (WELLBUTRIN PO) Take  by mouth two (2) times a day.  acetaminophen (TYLENOL) 325 mg tablet Take  by mouth every four (4) hours as needed for Pain.  SPIRIVA WITH HANDIHALER 18 mcg inhalation capsule Take 1 Cap by inhalation daily.  PROAIR HFA 90 mcg/actuation inhaler Take 1 Puff by inhalation.  amitriptyline (ELAVIL) 50 mg tablet TAKE ONE TABLET BY MOUTH ONCE DAILY AT  NIGHT 30 Tab 0    rOPINIRole (REQUIP) 0.25 mg tablet Take 0.25 mg by mouth.  docusate sodium (COLACE) 100 mg capsule Take 100 mg by mouth two (2) times a day.  omeprazole (PRILOSEC) 20 mg capsule Take 20 mg by mouth daily.  ramipril (ALTACE) 2.5 mg capsule Take 2.5 mg by mouth daily.  carvedilol (COREG) 6.25 mg tablet Take 6.25 mg by mouth two (2) times daily (with meals).  simvastatin (ZOCOR) 20 mg tablet Take 20 mg by mouth nightly.  aspirin delayed-release 81 mg tablet Take 81 mg by mouth daily.  furosemide (LASIX) 80 mg tablet Take  by mouth daily.  metFORMIN (GLUCOPHAGE) 1,000 mg tablet Take 1,000 mg by mouth two (2) times daily (with meals).          Past Medical History:   Diagnosis Date    Adverse effect of anesthesia 2012    nightmares in the past with ketamine, no problems with anes since    Anal fistula and Anal tag     CAD (coronary artery disease)     Diabetes ([facility] [address]     Emphysema     Follow-up examination following surgery     RLS (restless legs syndrome)     Unspecified adverse effect of anesthesia     Can not take Ketamine    Unspecified sleep apnea     no cpap      Past Surgical History:   Procedure Laterality Date    CARDIAC SURG PROCEDURE UNLIST  1/2014    cabgx2  repaired valve    HX BACK SURGERY  2001    HX GI      Excision of anal tag    HX GI      Modified Harrison procedure    HX HERNIA REPAIR  2009    HX LUMBAR FUSION      HX LUMBAR LAMINECTOMY      HX OTHER SURGICAL  2011    \"perirectal\"     Family History   Problem Relation Age of Onset    No Known Problems Mother     No Known Problems Father         Physical Examination: Well-nourished mature male in no apparent distress    Digital rectal examination-rubber seton in place/prostate is rounded smooth benign consistency and nontender      Urinalysis: Negative dipstick/nitrite negative/heme-negative    PVR today 60 cc        Impression: Symptomatic BPH status post TURP 2016- voiding symptoms most likely aggravated by anticholinergic effect of antidepressant and narcotic analgesics        Plan: Flomax 0.4 mg daily #90 refill ×3    Anticholinergic effects from Elavil and narcotic analgesics required offset by alpha-blocker Rx-discussed      rtc 1 yr      More than 1/2 of this 15 minute visit was spent in counselling and coordination of care, as described above.   Shasha Padgett MD  -electronically signed-    PLEASE NOTE:

## 2018-07-09 ENCOUNTER — OFFICE VISIT (OUTPATIENT)
Dept: ORTHOPEDIC SURGERY | Age: 73
End: 2018-07-09

## 2018-07-09 VITALS
WEIGHT: 281 LBS | RESPIRATION RATE: 20 BRPM | BODY MASS INDEX: 39.34 KG/M2 | HEIGHT: 71 IN | HEART RATE: 98 BPM | DIASTOLIC BLOOD PRESSURE: 83 MMHG | SYSTOLIC BLOOD PRESSURE: 152 MMHG | OXYGEN SATURATION: 95 %

## 2018-07-09 DIAGNOSIS — M54.50 LOW BACK PAIN, EPISODIC: ICD-10-CM

## 2018-07-09 DIAGNOSIS — Z98.1 S/P LUMBAR SPINAL FUSION: ICD-10-CM

## 2018-07-09 DIAGNOSIS — Z79.891 ENCOUNTER FOR LONG-TERM OPIATE ANALGESIC USE: ICD-10-CM

## 2018-07-09 DIAGNOSIS — M54.50 LOW BACK PAIN, EPISODIC: Primary | ICD-10-CM

## 2018-07-09 DIAGNOSIS — M96.1 LUMBAR POST-LAMINECTOMY SYNDROME: ICD-10-CM

## 2018-07-09 DIAGNOSIS — M48.062 SPINAL STENOSIS OF LUMBAR REGION WITH NEUROGENIC CLAUDICATION: ICD-10-CM

## 2018-07-09 RX ORDER — OXYCODONE AND ACETAMINOPHEN 10; 325 MG/1; MG/1
1 TABLET ORAL
Qty: 30 TAB | Refills: 0 | Status: SHIPPED | OUTPATIENT
Start: 2018-07-09 | End: 2018-10-08 | Stop reason: SDUPTHER

## 2018-07-09 NOTE — PATIENT INSTRUCTIONS
Learning About How to Have a Healthy Back What causes back pain? Back pain is often caused by overuse, strain, or injury. For example, people often hurt their backs playing sports or working in the yard, being jolted in a car accident, or lifting something too heavy. Aging plays a part too. Your bones and muscles tend to lose strength as you age, which makes injury more likely. The spongy discs between the bones of the spine (vertebrae) may suffer from wear and tear and no longer provide enough cushion between the bones. A disc that bulges or breaks open (herniated disc) can press on nerves, causing back pain. In some people, back pain is the result of arthritis, broken vertebrae caused by bone loss (osteoporosis), illness, or a spine problem. Although most people have back pain at one time or another, there are steps you can take to make it less likely. How can you have a healthy back? Reduce stress on your back through good posture Slumping or slouching alone may not cause low back pain. But after the back has been strained or injured, bad posture can make pain worse. · Sleep in a position that maintains your back's normal curves and on a mattress that feels comfortable. Sleep on your side with a pillow between your knees, or sleep on your back with a pillow under your knees. These positions can reduce strain on your back. · Stand and sit up straight. \"Good posture\" generally means your ears, shoulders, and hips are in a straight line. · If you must stand for a long time, put one foot on a stool, ledge, or box. Switch feet every now and then. · Sit in a chair that is low enough to let you place both feet flat on the floor with both knees nearly level with your hips. If your chair or desk is too high, use a footrest to raise your knees. Place a small pillow, a rolled-up towel, or a lumbar roll in the curve of your back if you need extra support.  
· Try a kneeling chair, which helps tilt your hips forward. This takes pressure off your lower back. · Try sitting on an exercise ball. It can rock from side to side, which helps keep your back loose. · When driving, keep your knees nearly level with your hips. Sit straight, and drive with both hands on the steering wheel. Your arms should be in a slightly bent position. Reduce stress on your back through careful lifting · Squat down, bending at the hips and knees only. If you need to, put one knee to the floor and extend your other knee in front of you, bent at a right angle (half kneeling). · Press your chest straight forward. This helps keep your upper back straight while keeping a slight arch in your low back. · Hold the load as close to your body as possible, at the level of your belly button (navel). · Use your feet to change direction, taking small steps. · Lead with your hips as you change direction. Keep your shoulders in line with your hips as you move. · Set down your load carefully, squatting with your knees and hips only. Exercise and stretch your back · Do some exercise on most days of the week, if your doctor says it is okay. You can walk, run, swim, or cycle. · Stretch your back muscles. Here are a few exercises to try: ¨ Lie on your back, and gently pull one bent knee to your chest. Put that foot back on the floor, and then pull the other knee to your chest. 
¨ Do pelvic tilts. Lie on your back with your knees bent. Tighten your stomach muscles. Pull your belly button (navel) in and up toward your ribs. You should feel like your back is pressing to the floor and your hips and pelvis are slightly lifting off the floor. Hold for 6 seconds while breathing smoothly. ¨ Sit with your back flat against a wall. · Keep your core muscles strong. The muscles of your back, belly (abdomen), and buttocks support your spine. ¨ Pull in your belly and imagine pulling your navel toward your spine. Hold this for 6 seconds, then relax.  Remember to keep breathing normally as you tense your muscles. ¨ Do curl-ups. Always do them with your knees bent. Keep your low back on the floor, and curl your shoulders toward your knees using a smooth, slow motion. Keep your arms folded across your chest. If this bothers your neck, try putting your hands behind your neck (not your head), with your elbows spread apart. ¨ Lie on your back with your knees bent and your feet flat on the floor. Tighten your belly muscles, and then push with your feet and raise your buttocks up a few inches. Hold this position 6 seconds as you continue to breathe normally, then lower yourself slowly to the floor. Repeat 8 to 12 times. ¨ If you like group exercise, try Pilates or yoga. These classes have poses that strengthen the core muscles. Lead a healthy lifestyle · Stay at a healthy weight to avoid strain on your back. · Do not smoke. Smoking increases the risk of osteoporosis, which weakens the spine. If you need help quitting, talk to your doctor about stop-smoking programs and medicines. These can increase your chances of quitting for good. Where can you learn more? Go to http://azalia-chloé.info/. Enter L315 in the search box to learn more about \"Learning About How to Have a Healthy Back. \" Current as of: March 21, 2017 Content Version: 11.4 © 5085-0488 [employer]. Care instructions adapted under license by [employer] (which disclaims liability or warranty for this information). If you have questions about a medical condition or this instruction, always ask your healthcare professional. [place] [address] any warranty or liability for your use of this information.

## 2018-07-09 NOTE — PROGRESS NOTES
[address] 
[address], [address] [place], [address] [place] [address] Street Phone: [phone] Fax: [phone] Leatha Mckeon : 1945 PCP: Tip Corona MD 
 
PROGRESS NOTE HISTORY OF PRESENT ILLNESS: 
Chief Complaint Patient presents with  Back Pain F/U Marcus Lai is a 66 y. o.  male with history of severe left- sided back pain that radiates into his flank. His pain is worse with sitting and weightbearing. Dr. Franci Hough saw him in 2017 and reviewed his MRI and CT. His spine was stable with no evidence of stenosis, instability or evidence of infection. He has a known perirectal abscess and this is where the pain is believed to be coming from. Dr. Franci Hough had discussed the possibility of a SCS but with the known chronic abscess he was reluctant due to the possibility of infection. He saw nothing surgical. At his last visit, he was having low back pain and bilateral anterior thigh to his feet pain. He has tried Lyrica in the past with great benefit but his insurance did not cover it.  
  
Today, he states he is having increased Lumbar pain due to a trip to [place]. He is having lumbar pain. he denies leg pain. He has mechanical pain. He is able to tolerate his pain with 1 percocet daily. He can perform his daily activities. Berenice Blackwood takes on average 30 tabs a month. He took 1 tablet last night. He ran out several days ago so this was the first and only dose in several days to a week. He was saving one tablet for an emergency. We discussed PM may be needed in the future. He will be traveling to [place] again in November for a great grand Wei birth. Denies bladder/bowel dysfunction, saddle paresthesia, weakness, gait disturbance, or other neurological deficit. Pt at this time desires to continue with current care/proceed with medication evaluation. He continues to have the perirectal abscess and Dr. Mindy Muhammad is following him for this.  
 
He presents today with 2-3 L. His tank battery was running low so he left it in his car. His wife brought his tank into him. Vitals: recheck:  
BP now 152/83, pulse 98, O2 sats 93-95% after he placed his oxygen on. He denies CP, SOB or feeling symptomatic. He knows to call 911 if he were to develop symptoms. He states any time he does not wear his )2 and walks any distance his sats will drop to 80%. Opioid Assessment 
   
Least pain over the last week has been 3/10. Worst pain over the last week has been 10/10. Opioid Risk Tool Reviewed: YES. The patient stopped drinking all alcohol x 3 years. Aberrant behaviors: None.   
Urine Drug Screen: due - order placed. Controlled substance agreement on file: YES, updated today.  
 reviewed:yes Pill count is consistent with his prescription: yes and n/a Concomitant use of a benzodiazepine: no 
MME: 10-15  
Also,  abstinence syndrome was reviewed and discussed with her today N/A 
   
Risks and benefits of ongoing opiate therapy have been reviewed with the patient.  No pain behaviors. Denies thoughts of harming self or others. Pt has a good risk to benefit ratio which allows the pt to function in a home environment without side effects 
   
This is a chronic problem that is stable.  Per review of available records and patients , there are not sign of overuse, misuse, diversion, or concerning side effects. Today we reviewed: the risk of overdose, addiction, and dependency  The following changes were made to the patients current treatment plan: nothing, medications refilled.  We discussed the pain diagnosis affecting the back. Significant changes since the last visit include: None.  With the medication, the patient is able to perform normal daily activities.    
ASSESSMENT 
74 y.o. male with lumbar pain. Diagnoses and all orders for this visit: 1. Low back pain, episodic 
-     DRUG SCREEN UR - W/ CONFIRM;  Future 
-     oxyCODONE-acetaminophen (PERCOCET 10)  mg per tablet; Take 1 Tab by mouth daily as needed for Pain. Indications: Pain, chronic pain 2. Spinal stenosis of lumbar region with neurogenic claudication 
-     DRUG SCREEN UR - W/ CONFIRM; Future 
-     oxyCODONE-acetaminophen (PERCOCET 10)  mg per tablet; Take 1 Tab by mouth daily as needed for Pain. Indications: Pain, chronic pain 3. S/P lumbar spinal fusion 
-     DRUG SCREEN UR - W/ CONFIRM; Future 
-     oxyCODONE-acetaminophen (PERCOCET 10)  mg per tablet; Take 1 Tab by mouth daily as needed for Pain. Indications: Pain, chronic pain 4. Lumbar post-laminectomy syndrome 
-     DRUG SCREEN UR - W/ CONFIRM; Future 
-     oxyCODONE-acetaminophen (PERCOCET 10)  mg per tablet; Take 1 Tab by mouth daily as needed for Pain. Indications: Pain, chronic pain 5. Encounter for long-term opiate analgesic use 
-     DRUG SCREEN UR - W/ CONFIRM; Future 
-     oxyCODONE-acetaminophen (PERCOCET 10)  mg per tablet; Take 1 Tab by mouth daily as needed for Pain. Indications: Pain, chronic pain IMPRESSION/PLAN 
 
1) Pt was given information on back care. 2) UDS today, percocet refill today. May call for further refills dependent on UDS. 3) Discussed PM if needed in the future. 4) Mr. Almas Jones has a reminder for a \"due or due soon\" health maintenance. I have asked that he contact his primary care provider, Colton Louise MD, for follow-up on this health maintenance. 5) We have informed patient to notify us for immediate appointment if he has any worsening neurogical symptoms or if an emergency situation presents, then call 911 
6) Pt will follow-up in 3 months. PAST MEDICAL HISTORY Past Medical History:  
Diagnosis Date  Adverse effect of anesthesia 2012  
 nightmares in the past with ketamine, no problems with anes since  Anal fistula and Anal tag  CAD (coronary artery disease)  Diabetes ([address]  Emphysema  Follow-up examination following surgery  RLS (restless legs syndrome)  Unspecified adverse effect of anesthesia Can not take Ketamine  Unspecified sleep apnea   
 no cpap MEDICATIONS Current Outpatient Prescriptions Medication Sig Dispense Refill  glycopyrrolate-formoterol (BEVESPI AEROSPHERE) 9-4.8 mcg HFAA Take  by inhalation two (2) times a day. Indications: 2 puffs twice a day  oxyCODONE-acetaminophen (PERCOCET 10)  mg per tablet Take 1 Tab by mouth daily as needed for Pain. Indications: Pain, chronic pain 30 Tab 0  
 tamsulosin (FLOMAX) 0.4 mg capsule Take 1 Cap by mouth daily. Indications: benign prostatic hyperplasia with lower urinary tract sx 90 Cap 3  
 oxyCODONE-acetaminophen (PERCOCET 10)  mg per tablet Daily to twice daily as needed  Indications: Pain, chronic pain 45 Tab 0  
 furosemide (LASIX) 40 mg tablet Take 40 mg by mouth daily.  rOPINIRole (REQUIP) 1 mg tablet Take 1 mg by mouth.  acetaminophen (TYLENOL) 325 mg tablet Take  by mouth every four (4) hours as needed for Pain.  PROAIR HFA 90 mcg/actuation inhaler Take 1 Puff by inhalation.  docusate sodium (COLACE) 100 mg capsule Take 100 mg by mouth two (2) times a day.  omeprazole (PRILOSEC) 20 mg capsule Take 20 mg by mouth daily.  ramipril (ALTACE) 2.5 mg capsule Take 2.5 mg by mouth daily.  carvedilol (COREG) 6.25 mg tablet Take 6.25 mg by mouth two (2) times daily (with meals).  simvastatin (ZOCOR) 20 mg tablet Take 20 mg by mouth nightly.  aspirin delayed-release 81 mg tablet Take 81 mg by mouth daily.  metFORMIN (GLUCOPHAGE) 1,000 mg tablet Take 1,000 mg by mouth daily.  oxyCODONE-acetaminophen (PERCOCET 10)  mg per tablet Take 1 Tab by mouth daily as needed for Pain. Indications: chronic pain 30 Tab 0  
 buPROPion XL (WELLBUTRIN XL) 150 mg tablet Take 150 mg by mouth every morning.  BUPROPION HCL (WELLBUTRIN PO) Take  by mouth two (2) times a day.     
 SPIRIVA WITH HANDIHALER 18 mcg inhalation capsule Take 1 Cap by inhalation daily.  amitriptyline (ELAVIL) 50 mg tablet TAKE ONE TABLET BY MOUTH ONCE DAILY AT  NIGHT 30 Tab 0  
 rOPINIRole (REQUIP) 0.25 mg tablet Take 0.25 mg by mouth.  furosemide (LASIX) 80 mg tablet Take  by mouth daily. ALLERGIES Allergies Allergen Reactions  Ativan [Lorazepam] Itching  Ketamine Drowsiness Other reaction(s): psychological reaction Pt reports the drowsiness is excessive  Retaine Cmc [Carboxymethylcellulose Sodium] Other (comments) SOCIAL HISTORY Social History Social History  Marital status:  Spouse name: N/A  
 Number of children: N/A  
 Years of education: N/A Occupational History  Not on file. Social History Main Topics  Smoking status: Current Every Day Smoker Packs/day: 2.00 Years: 54.00 Types: Cigarettes  Smokeless tobacco: Never Used Comment: currently - 5 cigars daily now no cigarettes  Alcohol use 0.6 oz/week 1 Shots of liquor per week  Drug use: No  
 Sexual activity: No  
 
Other Topics Concern  Not on file Social History Narrative SUBJECTIVE Pain Scale: 8/10 Pain Assessment  7/9/2018 Location of Pain Back Pain Location Comment - Location Modifiers - Severity of Pain 8 Quality of Pain Aching Quality of Pain Comment - Duration of Pain Persistent Frequency of Pain Constant Aggravating Factors Standing;Walking Aggravating Factors Comment - Limiting Behavior Some Relieving Factors Rest  
Relieving Factors Comment - Result of Injury No  
 
 
Accompanied by spouse. REVIEW OF SYSTEMS 
ROS Constitutional: Negative for fever, chills, or weight change. Respiratory: Negative for cough or shortness of breath. Cardiovascular: Negative for chest pain or palpitations.  
Gastrointestinal: Negative for acid reflux, change in bowel habits, or constipation. Genitourinary: Negative for incontinence, dysuria and flank pain. Musculoskeletal: Positive for lumbar pain. Skin: Negative for rash. Neurological: Negative for headaches, dizziness, or numbness. Endo/Heme/Allergies: Negative . Psychiatric/Behavioral: Negative. PHYSICAL EXAMINATION Visit Vitals  BP (!) 140/122  Pulse (!) 106  Resp 20  
 Ht 5' 11\" (1.803 m)  Wt 281 lb (127.5 kg)  SpO2 (!) 88%  BMI 39.19 kg/m2 Constitutional: Well developed,  well nourished,  awake, alert, and in no acute distress. Neurological:  Sensation to light touch is intact. Psychiatric: Affect and mood are appropriate. Integumentary: No rashes or abrasions noted on exposed areas,  warm, dry and intact. Cardiovascular/Peripheral Vascular:  No peripheral edema is noted. Lymphatic:  No evidence of lymphedema. No cervical lymphadenopathy. SPINE/MUSCULOSKELETAL EXAM 
 
 
Lumbar spine: No rash, ecchymosis, or gross obliquity. No fasciculations. No focal atrophy is noted. Range of motion is intact. Tenderness to palpation to lumbar pain. SI joints non-tender. Trochanters non tender. Musculoskeletal:  No pain with extension, axial loading, or forward flexion. No pain with internal or external rotation of his hips. MOTOR Hip Flex  Quads Hamstrings Ankle DF EHL Ankle PF Right +4/5 +4/5 +4/5 +4/5 +4/5 +4/5 Left +4/5 +4/5 +4/5 +4/5 +4/5 +4/5 Straight Leg raise negative bilaterally. normal gait and station Ambulation without assistive device. full weight bearing, non-antalgic gait.  
 
Shweta Lake NP

## 2018-07-23 DIAGNOSIS — M48.062 SPINAL STENOSIS OF LUMBAR REGION WITH NEUROGENIC CLAUDICATION: ICD-10-CM

## 2018-07-23 DIAGNOSIS — Z98.1 S/P LUMBAR SPINAL FUSION: ICD-10-CM

## 2018-07-23 RX ORDER — OXYCODONE AND ACETAMINOPHEN 10; 325 MG/1; MG/1
1 TABLET ORAL
Qty: 30 TAB | Refills: 0 | Status: SHIPPED | OUTPATIENT
Start: 2018-08-08 | End: 2018-08-16

## 2018-07-23 RX ORDER — OXYCODONE AND ACETAMINOPHEN 10; 325 MG/1; MG/1
1 TABLET ORAL
Qty: 30 TAB | Refills: 0 | Status: SHIPPED | OUTPATIENT
Start: 2018-09-07 | End: 2018-10-08 | Stop reason: SDUPTHER

## 2018-07-23 NOTE — TELEPHONE ENCOUNTER
[facility]     Last Visit: 07/09/2018 with NIRAV Greene    Next Appointment: 10/08/2018 with NP Buttery   Previous Refill Encounters: 07/09/2018 per NP Karan Junior #[id number]    Requested Prescriptions     Pending Prescriptions Disp Refills    oxyCODONE-acetaminophen (PERCOCET 10)  mg per tablet 45 Tab 0     Sig: Take 1 Tab by mouth daily as needed for Pain. DNF BEFORE START DATE  Indications: Pain, chronic pain    oxyCODONE-acetaminophen (PERCOCET 10)  mg per tablet 30 Tab 0     Sig: Take 1 Tab by mouth daily as needed for Pain.  DNF BEFORE START DATE  Indications: chronic pain

## 2018-07-23 NOTE — TELEPHONE ENCOUNTER
Spoke te o patient and verified . Patient I aware rx script is available for  at [facility] office.

## 2018-08-30 ENCOUNTER — OFFICE VISIT (OUTPATIENT)
Dept: UROLOGY | Age: 73
End: 2018-08-30

## 2018-08-30 VITALS
DIASTOLIC BLOOD PRESSURE: 60 MMHG | WEIGHT: 252 LBS | SYSTOLIC BLOOD PRESSURE: 104 MMHG | OXYGEN SATURATION: 90 % | HEART RATE: 89 BPM | BODY MASS INDEX: 35.28 KG/M2 | HEIGHT: 71 IN | TEMPERATURE: 97 F

## 2018-08-30 DIAGNOSIS — N40.1 BPH ASSOCIATED WITH NOCTURIA: Primary | ICD-10-CM

## 2018-08-30 DIAGNOSIS — R35.1 BPH ASSOCIATED WITH NOCTURIA: Primary | ICD-10-CM

## 2018-08-30 LAB
BILIRUB UR QL STRIP: NEGATIVE
GLUCOSE UR-MCNC: NEGATIVE MG/DL
KETONES P FAST UR STRIP-MCNC: NEGATIVE MG/DL
PH UR STRIP: 7 [PH] (ref 4.6–8)
PROT UR QL STRIP: NORMAL
SP GR UR STRIP: 1.01 (ref 1–1.03)
UA UROBILINOGEN AMB POC: NORMAL (ref 0.2–1)
URINALYSIS CLARITY POC: CLEAR
URINALYSIS COLOR POC: YELLOW
URINE BLOOD POC: NEGATIVE
URINE LEUKOCYTES POC: NORMAL
URINE NITRITES POC: NEGATIVE

## 2018-08-30 NOTE — PATIENT INSTRUCTIONS

## 2018-08-30 NOTE — PROGRESS NOTES
Marcus Lai 74 y.o. male Mr. Temitope Esposito seen today for follow-up symptomatic BPH on alpha-blocker therapy with Flomax relieving irritative voiding symptoms since TURP 2016 relieving obstructive symptoms-patient is now voiding with a forceful stream good control nocturia once per night Very happy with functional result of TURP-\" urinating like a teenager\" 
   
voiding with a forceful stream and good control but has had no favorable improvement in urgency or frequency still getting up 3 times per night and experiencing almost hourly daytime urgency and frequency Voiding na clear urine at this time    
   
Patient's only complaint this time is chronic low back pain 
   
   
irritable bladder symptoms prior to [address] [place] [address] responding favorably to alpha-blocker treatment- 
symptomatic BPH with history of chronic prostatitis treated with periodic 3 week courses of Cipro antibiotic therapy 
   
Cystoscopy 28 June 2016 showed normal urothelium with unobstructed prostate channel 
   
PSA 0.8 in May 2016 PSA is 0.5 in August 2017 PSA 0.8 in June 2018 
  
PVR 60 cc in June 2018 
 
   
Patient has extensive history of perirectal abscess since 2014 requiring numerous drainage procedures under general anesthesia-currently managed by maintenance indwelling rubber drain 
   
Review of Systems:   
CNS: No seizure syncope headaches or dizziness no visual changes Respiratory: No wheezing or shortness of breath Cardiovascular:Coronary artery disease status post CABG 2014/Hypertension Intestinal:No dyspepsia diarrhea or constipation/perirectal abscess with chronic perianal seton drainage Urinary:Irritative voiding symptoms   
Skeletal: No bone or joint pain Endocrine:Diabetes Other: 
 
 
Allergies: Allergies Allergies: Allergies Allergen Reactions  Ativan [Lorazepam] Itching  Ketamine Drowsiness Other reaction(s): psychological reaction Pt reports the drowsiness is excessive  Retaine Cmc [Carboxymethylcellulose Sodium] Other (comments) Pt denies allergy Medications:   
Current Outpatient Prescriptions Medication Sig Dispense Refill  [START ON 9/7/2018] oxyCODONE-acetaminophen (PERCOCET 10)  mg per tablet Take 1 Tab by mouth daily as needed for Pain. DNF BEFORE START DATE  Indications: chronic pain 30 Tab 0  
 glycopyrrolate-formoterol (BEVESPI AEROSPHERE) 9-4.8 mcg HFAA Take  by inhalation two (2) times a day. Indications: 2 puffs twice a day  oxyCODONE-acetaminophen (PERCOCET 10)  mg per tablet Take 1 Tab by mouth daily as needed for Pain. Indications: Pain, chronic pain 30 Tab 0  
 tamsulosin (FLOMAX) 0.4 mg capsule Take 1 Cap by mouth daily. Indications: benign prostatic hyperplasia with lower urinary tract sx 90 Cap 3  
 rOPINIRole (REQUIP) 1 mg tablet Take 1 mg by mouth daily.  acetaminophen (TYLENOL) 325 mg tablet Take  by mouth every four (4) hours as needed for Pain.  PROAIR HFA 90 mcg/actuation inhaler Take 1 Puff by inhalation every six (6) hours as needed.  docusate sodium (COLACE) 100 mg capsule Take 100 mg by mouth two (2) times a day.  omeprazole (PRILOSEC) 20 mg capsule Take 20 mg by mouth daily.  ramipril (ALTACE) 2.5 mg capsule Take 2.5 mg by mouth daily.  carvedilol (COREG) 6.25 mg tablet Take 6.25 mg by mouth two (2) times daily (with meals).  simvastatin (ZOCOR) 20 mg tablet Take 20 mg by mouth nightly.  aspirin delayed-release 81 mg tablet Take 81 mg by mouth daily.  furosemide (LASIX) 80 mg tablet Take  by mouth daily.  metFORMIN (GLUCOPHAGE) 1,000 mg tablet Take 1,000 mg by mouth daily. Past Medical History:  
Diagnosis Date  Adverse effect of anesthesia 2012  
 nightmares in the past with ketamine, no problems with anes since  Anal fistula and Anal tag  CAD (coronary artery disease) MI  
 Diabetes ([facility] [address]  Emphysema  Follow-up examination following surgery  Hypertension  RLS (restless legs syndrome)  Unspecified adverse effect of anesthesia Can not take Ketamine  Unspecified sleep apnea   
 no cpap Past Surgical History:  
Procedure Laterality Date  CARDIAC SURG PROCEDURE UNLIST  1/2014  
 cabgx2  repaired valve  COLONOSCOPY N/A 8/17/2018 COLONOSCOPY, SURVEILLANCE performed by Max Burden MD at [facility]  HX GI Excision of anal tag  HX GI Modified Harrison procedure  HX HERNIA REPAIR  2009  HX LUMBAR FUSION    
 HX LUMBAR LAMINECTOMY  HX OTHER SURGICAL  2011 \"perirectal\" Social History Social History  Marital status:  Spouse name: N/A  
 Number of children: N/A  
 Years of education: N/A Occupational History  Not on file. Social History Main Topics  Smoking status: Current Every Day Smoker Packs/day: 2.00 Years: 54.00 Types: Cigarettes  Smokeless tobacco: Never Used Comment: currently - 5 cigars daily now no cigarettes  Alcohol use 0.6 oz/week 1 Shots of liquor per week  Drug use: No  
 Sexual activity: No  
 
Other Topics Concern  Not on file Social History Narrative Family History Problem Relation Age of Onset  No Known Problems Mother  No Known Problems Father Physical Examination: Well-nourished mature male in no apparent distress Normal prostate by DEDRA in June 2018 Urinalysis: Negative heme /nitrite negative/++pro Impression: Symptomatic BPH stable on alpha-blocker Rx status post TURP 2060 Plan: Flomax 0.4 mg daily RTC 1 year PSA DEDRA PVR More than 1/2 of this 10 minute visit was spent in counselling and coordination of care, as described above. Jose Alejandro Dominguez MD 
-electronically signed- 
 
PLEASE NOTE: 
This document has been produced using voice recognition software. Unrecognized errors in transcription may be present.

## 2018-08-30 NOTE — PROGRESS NOTES
Mr. Nawaf Segura has a reminder for a \"due or due soon\" health maintenance. I have asked that he contact his primary care provider for follow-up on this health maintenance.

## 2018-08-30 NOTE — MR AVS SNAPSHOT
[facility] [facility] [address] [address] 
[phone] Patient: Rika Branham MRN: [id number] :1945 Visit Information Date & Time Provider Department Dept. Phone Encounter #  
 2018  1:30 PM [facility] [phone] [id number] Follow-up Instructions Return in about 1 year (around 2019) for PVR. Follow-up and Disposition History Your Appointments 10/8/2018  9:50 AM  
Follow Up with Lu Roman NP  
[facility] - SPECIALTY [facility] [address] [place] [address]) Appt Note: back 3 mo fu  
  [facility] [address] [place] [address]  
  
    
 2019  1:30 PM  
ULTRASOUND with Chris Hanson MD  
[facility] [address] [place] [address]) Appt Note: PVR/PSA  
 [address] [place] [address] [address]  
[phone] [address] [place] [address] Upcoming Health Maintenance Date Due HEMOGLOBIN A1C Q6M 1945 LIPID PANEL Q1 1945 FOOT EXAM Q1 10/8/1953 MICROALBUMIN Q1 10/8/1953 EYE EXAM RETINAL OR DILATED Q1 10/8/1953 DTaP/Tdap/Td series (1 - Tdap) 10/8/1964 FOBT Q 1 YEAR AGE 50-75 10/8/1993 ZOSTER VACCINE AGE 60> 2003 GLAUCOMA SCREENING Q2Y 10/8/2008 Pneumococcal 65+ Low/Medium Risk (1 of 2 - PCV13) 10/8/2008 Influenza Age 5 to Adult 2018 MEDICARE YEARLY EXAM 2018 Allergies as of 2018  Review Complete On: 2018 By: Chris Hanson MD  
  
 Severity Noted Reaction Type Reactions Ativan [Lorazepam]  2017    Itching Ketamine  2014    Drowsiness Other reaction(s): psychological reaction Pt reports the drowsiness is excessive Retaine Cmc [Carboxymethylcellulose Sodium]    Other (comments) Pt denies allergy Current Immunizations  Never Reviewed Name Date Influenza Vaccine 10/1/2017 Not reviewed this visit You Were Diagnosed With   
  
 Codes Comments BPH associated with nocturia    -  Primary ICD-10-CM: N40.1, R35.1 ICD-9-CM: 600.01, 788.43 Vitals BP Pulse Temp Height(growth percentile) Weight(growth percentile) SpO2  
 104/60 (BP 1 Location: Left arm, BP Patient Position: Sitting) 89 97 °F (36.1 °C) (Oral) 5' 11\" (1.803 m) 252 lb (114.3 kg) 90% BMI Smoking Status 35.15 kg/m2 Current Every Day Smoker BMI and BSA Data Body Mass Index Body Surface Area  
 35.15 kg/m 2 2.39 m 2 Preferred Pharmacy Pharmacy Name Phone [address] [facility], [address] [place] [phone] Your Updated Medication List  
  
   
This list is accurate as of 8/30/18  3:14 PM.  Always use your most recent med list. ALTACE 2.5 mg capsule Generic drug:  ramipril Take 2.5 mg by mouth daily. aspirin delayed-release 81 mg tablet Take 81 mg by mouth daily. BEVESPI AEROSPHERE 9-4.8 mcg Hfaa Generic drug:  glycopyrrolate-formoterol Take  by inhalation two (2) times a day. Indications: 2 puffs twice a day COLACE 100 mg capsule Generic drug:  docusate sodium Take 100 mg by mouth two (2) times a day. COREG 6.25 mg tablet Generic drug:  carvedilol Take 6.25 mg by mouth two (2) times daily (with meals). furosemide 80 mg tablet Commonly known as:  LASIX Take  by mouth daily. metFORMIN 1,000 mg tablet Commonly known as:  GLUCOPHAGE Take 1,000 mg by mouth daily. omeprazole 20 mg capsule Commonly known as:  PRILOSEC Take 20 mg by mouth daily. * oxyCODONE-acetaminophen  mg per tablet Commonly known as:  PERCOCET 10 Take 1 Tab by mouth daily as needed for Pain. Indications: Pain, chronic pain * oxyCODONE-acetaminophen  mg per tablet Commonly known as:  PERCOCET 10 Take 1 Tab by mouth daily as needed for Pain. DNF BEFORE START DATE  Indications: chronic pain Start taking on:  9/7/2018 PROAIR HFA 90 mcg/actuation inhaler Generic drug:  albuterol Take 1 Puff by inhalation every six (6) hours as needed. rOPINIRole 1 mg tablet Commonly known as:  Lisette Glimpse Take 1 mg by mouth daily. tamsulosin 0.4 mg capsule Commonly known as:  FLOMAX Take 1 Cap by mouth daily. Indications: benign prostatic hyperplasia with lower urinary tract sx TYLENOL 325 mg tablet Generic drug:  acetaminophen Take  by mouth every four (4) hours as needed for Pain. ZOCOR 20 mg tablet Generic drug:  simvastatin Take 20 mg by mouth nightly. * Notice: This list has 2 medication(s) that are the same as other medications prescribed for you. Read the directions carefully, and ask your doctor or other care provider to review them with you. We Performed the Following AMB POC URINALYSIS DIP STICK AUTO W/O MICRO [15359 CPT(R)] Follow-up Instructions Return in about 1 year (around 8/30/2019) for PVR. Patient Instructions Benign Prostatic Hyperplasia: Care Instructions Your Care Instructions Benign prostatic hyperplasia, or BPH, is an enlarged prostate gland. The prostate is a small gland that makes some of the fluid in semen. Prostate enlargement happens to almost all men as they age. It is usually not serious. BPH does not cause prostate cancer. As the prostate gets bigger, it may partly block the flow of urine. You may have a hard time getting a urine stream started or completely stopped. BPH can cause dribbling. You may have a weak urine stream, or you may have to urinate more often than you used to, especially at night. Most men find these problems easy to manage.  
You do not need treatment unless your symptoms bother you a lot or you have other problems, such as bladder infections or stones. In these cases, medicines may help. Surgery is not needed unless the urine flow is blocked or the symptoms do not get better with medicine. Follow-up care is a key part of your treatment and safety. Be sure to make and go to all appointments, and call your doctor if you are having problems. It's also a good idea to know your test results and keep a list of the medicines you take. How can you care for yourself at home? · Take plenty of time to urinate. Try to relax. · Try \"double voiding. \" Urinate as much you can, relax for a few moments, and then try to urinate again. · Sit on the toilet to urinate. · Read or think of other things while you are waiting. · Turn on a faucet, or try to picture running water. Some men find that this helps get their urine flowing. · If dribbling is a problem, wash your penis daily to avoid skin irritation and infection. · Avoid caffeine and alcohol. These drinks will increase how often you need to urinate. Spread your fluid intake throughout the day. If the urge to urinate often wakes you at night, limit your fluid intake in the evening. Urinate right before you go to bed. · Many over-the-counter cold and allergy medicines can make the symptoms of BPH worse. Avoid antihistamines, decongestants, and allergy pills, if you can. Read the warnings on the package. · If you take any prescription medicines, especially tranquilizers or antidepressants, ask your doctor or pharmacist whether they can cause urination problems. There may be other medicines you can use that do not cause urinary problems. · Be safe with medicines. Take your medicines exactly as prescribed. Call your doctor if you think you are having a problem with your medicine. When should you call for help? Call your doctor now or seek immediate medical care if: 
  · You cannot urinate at all.  
  · You have symptoms of a urinary infection. For example: ¨ You have blood or pus in your urine. ¨ You have pain in your back just below your rib cage. This is called flank pain. ¨ You have a fever, chills, or body aches. ¨ It hurts to urinate. ¨ You have groin or belly pain.  
 Watch closely for changes in your health, and be sure to contact your doctor if: 
  · It hurts when you ejaculate.  
  · Your urinary problems get a lot worse or bother you a lot. Where can you learn more? Go to http://azalia-chloé.info/. Enter I222 in the search box to learn more about \"Benign Prostatic Hyperplasia: Care Instructions. \" Current as of: December 3, 2017 Content Version: 11.7 © 6198-9514 [employer]. Care instructions adapted under license by [employer] (which disclaims liability or warranty for this information). If you have questions about a medical condition or this instruction, always ask your healthcare professional. [place] [address] any warranty or liability for your use of this information. Patient Instructions History Introducing [facility]! [employer] introduces Tamion patient portal. Now you can access parts of your medical record, email your doctor's office, and request medication refills online. 1. In your internet browser, go to [email or web address] 2. Click on the First Time User? Click Here link in the Sign In box. You will see the New Member Sign Up page. 3. Enter your Tamion Access Code exactly as it appears below. You will not need to use this code after youve completed the sign-up process. If you do not sign up before the expiration date, you must request a new code. · Tamion Access Code: [email or web address] Expires: 10/4/2018  1:49 PM 
 
4. Enter the last four digits of your Social Security Number (xxxx) and Date of Birth (mm/dd/yyyy) as indicated and click Submit. You will be taken to the next sign-up page. 5. Create a [employer] ID. This will be your [employer] login ID and cannot be changed, so think of one that is secure and easy to remember. 6. Create a [employer] password. You can change your password at any time. 7. Enter your Password Reset Question and Answer. This can be used at a later time if you forget your password. 8. Enter your e-mail address. You will receive e-mail notification when new information is available in [address] 9. Click Sign Up. You can now view and download portions of your medical record. 10. Click the Download Summary menu link to download a portable copy of your medical information. If you have questions, please visit the Frequently Asked Questions section of the [employer] website. Remember, [employer] is NOT to be used for urgent needs. For medical emergencies, dial 911. Now available from your iPhone and Android! Please provide this summary of care documentation to your next provider. Your primary care clinician is listed as Spencer Mora. If you have any questions after today's visit, please call [phone].

## 2018-10-08 ENCOUNTER — OFFICE VISIT (OUTPATIENT)
Dept: ORTHOPEDIC SURGERY | Age: 73
End: 2018-10-08

## 2018-10-08 VITALS
DIASTOLIC BLOOD PRESSURE: 70 MMHG | WEIGHT: 282 LBS | BODY MASS INDEX: 39.48 KG/M2 | HEART RATE: 79 BPM | HEIGHT: 71 IN | SYSTOLIC BLOOD PRESSURE: 157 MMHG

## 2018-10-08 DIAGNOSIS — M96.1 LUMBAR POST-LAMINECTOMY SYNDROME: ICD-10-CM

## 2018-10-08 DIAGNOSIS — Z98.1 S/P LUMBAR SPINAL FUSION: ICD-10-CM

## 2018-10-08 DIAGNOSIS — M54.50 LOW BACK PAIN, EPISODIC: ICD-10-CM

## 2018-10-08 DIAGNOSIS — M48.062 SPINAL STENOSIS OF LUMBAR REGION WITH NEUROGENIC CLAUDICATION: ICD-10-CM

## 2018-10-08 DIAGNOSIS — Z79.891 ENCOUNTER FOR LONG-TERM OPIATE ANALGESIC USE: ICD-10-CM

## 2018-10-08 RX ORDER — OXYCODONE AND ACETAMINOPHEN 10; 325 MG/1; MG/1
1 TABLET ORAL
Qty: 30 TAB | Refills: 0 | Status: SHIPPED | OUTPATIENT
Start: 2018-10-07

## 2018-10-08 RX ORDER — OXYCODONE AND ACETAMINOPHEN 10; 325 MG/1; MG/1
1 TABLET ORAL
Qty: 45 TAB | Refills: 0 | Status: SHIPPED | OUTPATIENT
Start: 2018-11-06

## 2018-10-08 RX ORDER — OXYCODONE AND ACETAMINOPHEN 10; 325 MG/1; MG/1
1 TABLET ORAL
Qty: 30 TAB | Refills: 0 | Status: SHIPPED | OUTPATIENT
Start: 2018-12-05 | End: 2019-01-07 | Stop reason: SDUPTHER

## 2018-10-08 NOTE — PROGRESS NOTES
[address] 
[address], [address] [place], Formerly named [facility] [address] Phone: [phone] Fax: [phone] Oris Dock : 1945 PCP: Bhanu Tubbs MD 
 
PROGRESS NOTE HISTORY OF PRESENT ILLNESS: 
Chief Complaint Patient presents with  Follow-up  
  lumbar BLE pain/numbness Marcus Lai is a 66 y. o.  male with history of severe left- sided back pain that radiates into his flank. His pain is worse with sitting and weightbearing. Dr. Susan Coats saw him in 2017 and reviewed his MRI and CT. His spine was stable with no evidence of stenosis, instability or evidence of infection. He has a known perirectal abscess and this is where the pain is believed to be coming from. Dr. Susan Coats had discussed the possibility of a SCS but with the known chronic abscess he was reluctant due to the possibility of infection. He saw nothing surgical. At his last visit, he was having low back pain and bilateral anterior thigh to his feet pain. He has tried Lyrica in the past with great benefit but his insurance did not cover it. At his last visit,he was taking 1 tab of Percocet daily for pain. We discussed PM may be needed in the future. He travels to [place] to visit family. He is now considering moving there. He continues to have the perirectal abscess and Dr. Manasa Vann is following him for this. 
   
Opioid Assessment 
   
Least pain over the last week has been 3/10. Worst pain over the last week has been 10/10. Opioid Risk Tool Reviewed: YES. The patient stopped drinking all alcohol x 3 years. Aberrant behaviors: None.   
Urine Drug Screen: due - order placed. Controlled substance agreement on file: YES, updated today.  
 reviewed:yes Pill count is consistent with his prescription: yes and n/a Concomitant use of a benzodiazepine: no 
MME: 10-15  
Also,  abstinence syndrome was reviewed and discussed with her today N/A 
   
 Risks and benefits of ongoing opiate therapy have been reviewed with the patient.  No pain behaviors. Denies thoughts of harming self or others. Pt has a good risk to benefit ratio which allows the pt to function in a home environment without side effects 
   
This is a chronic problem that is stable.  Per review of available records and patients , there are not sign of overuse, misuse, diversion, or concerning side effects. Today we reviewed: the risk of overdose, addiction, and dependency  The following changes were made to the patients current treatment plan: nothing, medications refilled.  We discussed the pain diagnosis affecting the back. Significant changes since the last visit include: None.  With the medication, the patient is able to perform normal daily activities. Denies bladder/bowel dysfunction, saddle paresthesia, weakness, gait disturbance, or other neurological deficit. Pt at this time desires to  continue with current care. ASSESSMENT 
74 y.o. male with lumbar pain. Diagnoses and all orders for this visit: 1. Spinal stenosis of lumbar region with neurogenic claudication 
-     oxyCODONE-acetaminophen (PERCOCET 10)  mg per tablet; Take 1 Tab by mouth daily as needed for Pain. Indications: Pain, chronic pain 
-     oxyCODONE-acetaminophen (PERCOCET 10)  mg per tablet; Take 1 Tab by mouth daily as needed for Pain. DNF BEFORE START DATE  Indications: chronic pain 
-     oxyCODONE-acetaminophen (PERCOCET 10)  mg per tablet; Take 1 Tab by mouth daily as needed for Pain. Indications: Pain, chronic pain 2. S/P lumbar spinal fusion 
-     oxyCODONE-acetaminophen (PERCOCET 10)  mg per tablet; Take 1 Tab by mouth daily as needed for Pain. Indications: Pain, chronic pain 
-     oxyCODONE-acetaminophen (PERCOCET 10)  mg per tablet; Take 1 Tab by mouth daily as needed for Pain. DNF BEFORE START DATE  Indications: chronic pain -     oxyCODONE-acetaminophen (PERCOCET 10)  mg per tablet; Take 1 Tab by mouth daily as needed for Pain. Indications: Pain, chronic pain 3. Low back pain, episodic 
-     oxyCODONE-acetaminophen (PERCOCET 10)  mg per tablet; Take 1 Tab by mouth daily as needed for Pain. Indications: Pain, chronic pain 
-     oxyCODONE-acetaminophen (PERCOCET 10)  mg per tablet; Take 1 Tab by mouth daily as needed for Pain. DNF BEFORE START DATE  Indications: chronic pain 
-     oxyCODONE-acetaminophen (PERCOCET 10)  mg per tablet; Take 1 Tab by mouth daily as needed for Pain. Indications: Pain, chronic pain 4. Lumbar post-laminectomy syndrome 
-     oxyCODONE-acetaminophen (PERCOCET 10)  mg per tablet; Take 1 Tab by mouth daily as needed for Pain. Indications: Pain, chronic pain 
-     oxyCODONE-acetaminophen (PERCOCET 10)  mg per tablet; Take 1 Tab by mouth daily as needed for Pain. DNF BEFORE START DATE  Indications: chronic pain 
-     oxyCODONE-acetaminophen (PERCOCET 10)  mg per tablet; Take 1 Tab by mouth daily as needed for Pain. Indications: Pain, chronic pain 5. Encounter for long-term opiate analgesic use 
-     oxyCODONE-acetaminophen (PERCOCET 10)  mg per tablet; Take 1 Tab by mouth daily as needed for Pain. Indications: Pain, chronic pain 
-     oxyCODONE-acetaminophen (PERCOCET 10)  mg per tablet; Take 1 Tab by mouth daily as needed for Pain. DNF BEFORE START DATE  Indications: chronic pain 
-     oxyCODONE-acetaminophen (PERCOCET 10)  mg per tablet; Take 1 Tab by mouth daily as needed for Pain. Indications: Pain, chronic pain IMPRESSION/PLAN 
 
1) Pt was given information on lumbar care. 2) Continue Percocet 1 tab daily PRN. We increased his November script to 45 tabs monthly as he will be traveling to [place] this month. We do this each time he travels as he has to take 1-2 tabs daily for the drive. 3) Can try Tylenol PM at night to assist with sleep. 4) Mr. Suzanne Bethea has a reminder for a \"due or due soon\" health maintenance. I have asked that he contact his primary care provider, Heriberto Cantrell MD, for follow-up on this health maintenance. 5) We have informed patient to notify us for immediate appointment if he has any worsening neurogical symptoms or if an emergency situation presents, then call 911 
6) Pt will follow-up in 3 months for med fu. Risks and benefits of ongoing opiate therapy have been reviewed with the patient.  is appropriate. UDS results have been consistent, last UDS 7/2018. No pain behaviors. Denies thoughts of harming self or others. Pt has a good risk to benefit ratio which allows the pt to function in a home environment without side effects. PAST MEDICAL HISTORY Past Medical History:  
Diagnosis Date  Adverse effect of anesthesia 2012  
 nightmares in the past with ketamine, no problems with anes since  Anal fistula and Anal tag  CAD (coronary artery disease) MI  
 Diabetes ([address]  Emphysema  Follow-up examination following surgery  Hypertension  RLS (restless legs syndrome)  Unspecified adverse effect of anesthesia Can not take Ketamine  Unspecified sleep apnea   
 no cpap MEDICATIONS Current Outpatient Prescriptions Medication Sig Dispense Refill  [START ON 12/5/2018] oxyCODONE-acetaminophen (PERCOCET 10)  mg per tablet Take 1 Tab by mouth daily as needed for Pain. Indications: Pain, chronic pain 30 Tab 0  
 [START ON 11/6/2018] oxyCODONE-acetaminophen (PERCOCET 10)  mg per tablet Take 1 Tab by mouth daily as needed for Pain. DNF BEFORE START DATE  Indications: chronic pain 45 Tab 0  
 oxyCODONE-acetaminophen (PERCOCET 10)  mg per tablet Take 1 Tab by mouth daily as needed for Pain.  Indications: Pain, chronic pain 30 Tab 0  
 glycopyrrolate-formoterol (BEVESPI AEROSPHERE) 9-4.8 mcg HFAA Take  by inhalation two (2) times a day. Indications: 2 puffs twice a day  tamsulosin (FLOMAX) 0.4 mg capsule Take 1 Cap by mouth daily. Indications: benign prostatic hyperplasia with lower urinary tract sx 90 Cap 3  
 rOPINIRole (REQUIP) 1 mg tablet Take 1 mg by mouth daily.  acetaminophen (TYLENOL) 325 mg tablet Take  by mouth every four (4) hours as needed for Pain.  PROAIR HFA 90 mcg/actuation inhaler Take 1 Puff by inhalation every six (6) hours as needed.  docusate sodium (COLACE) 100 mg capsule Take 100 mg by mouth two (2) times a day.  omeprazole (PRILOSEC) 20 mg capsule Take 20 mg by mouth daily.  ramipril (ALTACE) 2.5 mg capsule Take 2.5 mg by mouth daily.  carvedilol (COREG) 6.25 mg tablet Take 6.25 mg by mouth two (2) times daily (with meals).  simvastatin (ZOCOR) 20 mg tablet Take 20 mg by mouth nightly.  aspirin delayed-release 81 mg tablet Take 81 mg by mouth daily.  furosemide (LASIX) 80 mg tablet Take  by mouth daily.  metFORMIN (GLUCOPHAGE) 1,000 mg tablet Take 1,000 mg by mouth daily. ALLERGIES Allergies Allergen Reactions  Ativan [Lorazepam] Itching  Ketamine Drowsiness Other reaction(s): psychological reaction Pt reports the drowsiness is excessive  Retaine Cmc [Carboxymethylcellulose Sodium] Other (comments) Pt denies allergy SOCIAL HISTORY Social History Social History  Marital status:  Spouse name: N/A  
 Number of children: N/A  
 Years of education: N/A Occupational History  Not on file. Social History Main Topics  Smoking status: Current Every Day Smoker Packs/day: 2.00 Years: 54.00 Types: Cigarettes  Smokeless tobacco: Never Used Comment: currently - 5 cigars daily now no cigarettes  Alcohol use 0.6 oz/week 1 Shots of liquor per week  Drug use: No  
 Sexual activity: No  
 
Other Topics Concern  Not on file Social History Narrative SUBJECTIVE Pain Scale: 8/10 Pain Assessment  10/8/2018 Location of Pain Back;Leg  
Pain Location Comment - Location Modifiers Left;Right Severity of Pain 8 Quality of Pain Aching Quality of Pain Comment numbness Duration of Pain Persistent Frequency of Pain Constant Aggravating Factors - Aggravating Factors Comment - Limiting Behavior Some Relieving Factors Nothing Relieving Factors Comment - Result of Injury No  
 
 
Accompanied by spouse. REVIEW OF SYSTEMS 
ROS Constitutional: Negative for fever, chills, or weight change. Respiratory: Negative for cough or shortness of breath. Cardiovascular: Negative for chest pain or palpitations. Gastrointestinal: Negative for acid reflux, change in bowel habits, or constipation. Genitourinary: Negative for incontinence, dysuria and flank pain. Musculoskeletal: Positive for lumbar pain. Skin: Negative for rash. Neurological: Negative for headaches, dizziness, or numbness. Endo/Heme/Allergies: Negative . Psychiatric/Behavioral: Negative. PHYSICAL EXAMINATION Visit Vitals  /70  Pulse 79  
 Ht 5' 11\" (1.803 m)  Wt 282 lb (127.9 kg)  BMI 39.33 kg/m2 Constitutional: Well developed,  well nourished,  awake, alert, and in no acute distress. Neurological:  Sensation to light touch is intact. Psychiatric: Affect and mood are appropriate. Integumentary: No rashes or abrasions noted on exposed areas,  warm, dry and intact. Cardiovascular/Peripheral Vascular:  No peripheral edema is noted. Lymphatic:  No evidence of lymphedema. No cervical lymphadenopathy. SPINE/MUSCULOSKELETAL EXAM 
 
Lumbar spine: No rash, ecchymosis, or gross obliquity. No fasciculations. No focal atrophy is noted. Range of motion is intact. Tenderness to palpation to lumbar spine. SI joints non-tender. Trochanters non tender. Musculoskeletal: pain with extension, axial loading, or forward flexion. No pain with internal or external rotation of his hips. MOTOR Hip Flex  Quads Hamstrings Ankle DF EHL Ankle PF Right +4/5 +4/5 +4/5 +4/5 +4/5 +4/5 Left +4/5 +4/5 +4/5 +4/5 +4/5 +4/5 Straight Leg raise negative bilateraly. normal gait and station Ambulation without assistive device. full weight bearing, non-antalgic gait.  
 
Penny Davison, NP

## 2019-01-07 ENCOUNTER — OFFICE VISIT (OUTPATIENT)
Dept: ORTHOPEDIC SURGERY | Age: 74
End: 2019-01-07

## 2019-01-07 VITALS
BODY MASS INDEX: 38.86 KG/M2 | RESPIRATION RATE: 17 BRPM | TEMPERATURE: 97.3 F | DIASTOLIC BLOOD PRESSURE: 74 MMHG | SYSTOLIC BLOOD PRESSURE: 127 MMHG | HEART RATE: 80 BPM | HEIGHT: 71 IN | WEIGHT: 277.6 LBS | OXYGEN SATURATION: 93 %

## 2019-01-07 DIAGNOSIS — M54.50 LOW BACK PAIN, EPISODIC: ICD-10-CM

## 2019-01-07 DIAGNOSIS — M48.062 SPINAL STENOSIS OF LUMBAR REGION WITH NEUROGENIC CLAUDICATION: ICD-10-CM

## 2019-01-07 DIAGNOSIS — M96.1 LUMBAR POST-LAMINECTOMY SYNDROME: ICD-10-CM

## 2019-01-07 DIAGNOSIS — Z98.1 S/P LUMBAR SPINAL FUSION: ICD-10-CM

## 2019-01-07 DIAGNOSIS — Z79.891 ENCOUNTER FOR LONG-TERM OPIATE ANALGESIC USE: ICD-10-CM

## 2019-01-07 RX ORDER — OXYCODONE AND ACETAMINOPHEN 10; 325 MG/1; MG/1
1 TABLET ORAL
Qty: 60 TAB | Refills: 0 | Status: SHIPPED | OUTPATIENT
Start: 2019-01-07 | End: 2019-01-07 | Stop reason: SDUPTHER

## 2019-01-07 RX ORDER — OXYCODONE AND ACETAMINOPHEN 10; 325 MG/1; MG/1
1 TABLET ORAL
Qty: 60 TAB | Refills: 0 | Status: SHIPPED | OUTPATIENT
Start: 2019-01-16 | End: 2019-02-08 | Stop reason: SDUPTHER

## 2019-01-07 NOTE — PROGRESS NOTES
[address] 
[address], [address] [facility], [address] Phone: [phone] Fax: [phone] Svetlana Lindsay : 1945 PCP: Ada Wright MD 
 
PROGRESS NOTE HISTORY OF PRESENT ILLNESS: 
Chief Complaint Patient presents with  Back Pain Lower  Hip Pain Left  Leg Pain Left  Follow-up  
  3 MO Marcus Lai is a 66 y. o.  male with history of severe left- sided back pain that radiates into his flank. His pain is worse with sitting and weightbearing. Dr. Júnior Zhang saw him in 2017 and reviewed his MRI and CT. His spine was stable with no evidence of stenosis, instability or evidence of infection. He has a known perirectal abscess and this is where the pain is believed to be coming from. Dr. Júnior Zhang had discussed the possibility of a SCS but with the known chronic abscess he was reluctant due to the possibility of infection. He saw nothing surgical. At his last visit, he was having low back pain and bilateral anterior thigh to his feet pain. He has tried Lyrica in the past with great benefit but his insurance did not cover it. At his last visit, he was taking 1 tab of Percocet daily for pain. We discussed PM may be needed in the future. He travels to [place] to visit family. He is now considering moving there. He continues to have the perirectal abscess and Dr. Lopes Members is following him for this. Today, he states he has good and bad days. I temporarily increased his medication to 45 tabs a month when he was driving. He noticed this greatly helped is pain. He is asking for TID dosing as he feel this would better manage his pain. He has this chronic back pain.  
   
Opioid Assessment 
   
Least pain over the last week has been 3/10. Worst pain over the last week has been 10/10. Opioid Risk Tool Reviewed: YES. The patient stopped drinking all alcohol x 3 years.   
Aberrant behaviors: None.   
 Urine Drug Screen: due - order placed. Controlled substance agreement on file: YES, updated today.  
 reviewed:yes Pill count is consistent with his prescription: yes and n/a Concomitant use of a benzodiazepine: no 
MME: 10-15  
Also,  abstinence syndrome was reviewed and discussed with her today N/A 
   
Risks and benefits of ongoing opiate therapy have been reviewed with the patient.  No pain behaviors. Denies thoughts of harming self or others. Pt has a good risk to benefit ratio which allows the pt to function in a home environment without side effects 
   
This is a chronic problem that is stable.  Per review of available records and patients , there are not sign of overuse, misuse, diversion, or concerning side effects. Today we reviewed: the risk of overdose, addiction, and dependency  The following changes were made to the patients current treatment plan: nothing, medications refilled.  We discussed the pain diagnosis affecting the back. Significant changes since the last visit include: increase his medication to BID.  With the medication, the patient is able to perform normal daily activities.  
  
Denies bladder/bowel dysfunction, saddle paresthesia, weakness, gait disturbance, or other neurological deficit. Pt at this time desires to continue with current care. ASSESSMENT 
74 y.o. male with lumbar pain. Diagnoses and all orders for this visit: 1. Spinal stenosis of lumbar region with neurogenic claudication 
-     DRUG SCREEN UR - W/ CONFIRM; Future 
-     oxyCODONE-acetaminophen (PERCOCET 10)  mg per tablet; Take 1 Tab by mouth every twelve (12) hours as needed for Pain. Max Daily Amount: 2 Tabs. 2. S/P lumbar spinal fusion 
-     DRUG SCREEN UR - W/ CONFIRM; Future 
-     oxyCODONE-acetaminophen (PERCOCET 10)  mg per tablet; Take 1 Tab by mouth every twelve (12) hours as needed for Pain. Max Daily Amount: 2 Tabs. 3. Low back pain, episodic -     DRUG SCREEN UR - W/ CONFIRM; Future 
-     oxyCODONE-acetaminophen (PERCOCET 10)  mg per tablet; Take 1 Tab by mouth every twelve (12) hours as needed for Pain. Max Daily Amount: 2 Tabs. 4. Lumbar post-laminectomy syndrome 
-     DRUG SCREEN UR - W/ CONFIRM; Future 
-     oxyCODONE-acetaminophen (PERCOCET 10)  mg per tablet; Take 1 Tab by mouth every twelve (12) hours as needed for Pain. Max Daily Amount: 2 Tabs. 5. Encounter for long-term opiate analgesic use 
-     DRUG SCREEN UR - W/ CONFIRM; Future 
-     oxyCODONE-acetaminophen (PERCOCET 10)  mg per tablet; Take 1 Tab by mouth every twelve (12) hours as needed for Pain. Max Daily Amount: 2 Tabs. IMPRESSION/PLAN 
 
1) Pt was given information on back care. 2) UDS today. 3) Increase percocet to BID to see if this covers his pain better. He can call for his refills dependent on UDS. His last dose of Percocet was a few days back. He is planning on moving to [place]. We discussed again, a referral to PM could be made. He will see how this BID dosing works. 4) Mr. Husam Mcnair has a reminder for a \"due or due soon\" health maintenance. I have asked that he contact his primary care provider, Karen Wright MD, for follow-up on this health maintenance. 5) We have informed patient to notify us for immediate appointment if he has any worsening neurogical symptoms or if an emergency situation presents, then call 911 
6) Pt will follow-up in 3 months if still in the area. Risks and benefits of ongoing opiate therapy have been reviewed with the patient.  is appropriate. UDS results have been consistent, last UDS 7/2018. No pain behaviors. Denies thoughts of harming self or others. Pt has a good risk to benefit ratio which allows the pt to function in a home environment without side effects. PAST MEDICAL HISTORY Past Medical History:  
Diagnosis Date  Adverse effect of anesthesia 2012 nightmares in the past with ketamine, no problems with anes since  Anal fistula and Anal tag  CAD (coronary artery disease) MI  
 Diabetes [address] [address]  Emphysema  Follow-up examination following surgery  Hypertension  RLS (restless legs syndrome)  Unspecified adverse effect of anesthesia Can not take Ketamine  Unspecified sleep apnea   
 no cpap MEDICATIONS Current Outpatient Medications Medication Sig Dispense Refill  [START ON 1/16/2019] oxyCODONE-acetaminophen (PERCOCET 10)  mg per tablet Take 1 Tab by mouth every twelve (12) hours as needed for Pain. Max Daily Amount: 2 Tabs. 60 Tab 0  
 glycopyrrolate-formoterol (BEVESPI AEROSPHERE) 9-4.8 mcg HFAA Take  by inhalation two (2) times a day. Indications: 2 puffs twice a day  tamsulosin (FLOMAX) 0.4 mg capsule Take 1 Cap by mouth daily. Indications: benign prostatic hyperplasia with lower urinary tract sx 90 Cap 3  
 rOPINIRole (REQUIP) 1 mg tablet Take 1 mg by mouth daily.  acetaminophen (TYLENOL) 325 mg tablet Take  by mouth every four (4) hours as needed for Pain.  PROAIR HFA 90 mcg/actuation inhaler Take 1 Puff by inhalation every six (6) hours as needed.  docusate sodium (COLACE) 100 mg capsule Take 100 mg by mouth two (2) times a day.  omeprazole (PRILOSEC) 20 mg capsule Take 20 mg by mouth daily.  ramipril (ALTACE) 2.5 mg capsule Take 2.5 mg by mouth daily.  carvedilol (COREG) 6.25 mg tablet Take 6.25 mg by mouth two (2) times daily (with meals).  simvastatin (ZOCOR) 20 mg tablet Take 20 mg by mouth nightly.  aspirin delayed-release 81 mg tablet Take 81 mg by mouth daily.  furosemide (LASIX) 80 mg tablet Take  by mouth daily.  metFORMIN (GLUCOPHAGE) 1,000 mg tablet Take 1,000 mg by mouth daily.  oxyCODONE-acetaminophen (PERCOCET 10)  mg per tablet Take 1 Tab by mouth daily as needed for Pain.  DNF BEFORE START DATE  Indications: chronic pain (Patient not taking: Reported on 1/7/2019) 45 Tab 0  
 oxyCODONE-acetaminophen (PERCOCET 10)  mg per tablet Take 1 Tab by mouth daily as needed for Pain. Indications: Pain, chronic pain (Patient not taking: Reported on 1/7/2019) 30 Tab 0 ALLERGIES Allergies Allergen Reactions  Ativan [Lorazepam] Itching  Ketamine Drowsiness Other reaction(s): psychological reaction Pt reports the drowsiness is excessive  Retaine Cmc [Carboxymethylcellulose Sodium] Other (comments) Pt denies allergy SOCIAL HISTORY Social History Socioeconomic History  Marital status:  Spouse name: Not on file  Number of children: Not on file  Years of education: Not on file  Highest education level: Not on file Social Needs  Financial resource strain: Not on file  Food insecurity - worry: Not on file  Food insecurity - inability: Not on file  Transportation needs - medical: Not on file  Transportation needs - non-medical: Not on file Occupational History  Not on file Tobacco Use  Smoking status: Current Every Day Smoker Packs/day: 2.00 Years: 54.00 Pack years: 108.00 Types: Cigarettes  Smokeless tobacco: Never Used  Tobacco comment: currently - 5 cigars daily now no cigarettes Substance and Sexual Activity  Alcohol use: Yes Alcohol/week: 0.6 oz Types: 1 Shots of liquor per week  Drug use: No  
 Sexual activity: No  
  Partners: Female Other Topics Concern  Not on file Social History Narrative  Not on file SUBJECTIVE Pain Scale: 4/10 Pain Assessment  1/7/2019 Location of Pain Back;Hip;Leg  
Pain Location Comment - Location Modifiers Left Severity of Pain 4 Quality of Pain Dull; Other (Comment) Quality of Pain Comment Stabbing, numbness Duration of Pain Persistent Frequency of Pain Constant Aggravating Factors Standing;Walking Aggravating Factors Comment -  
 Limiting Behavior Some Relieving Factors Rest  
Relieving Factors Comment - Result of Injury No  
 
 
Accompanied by spouse. REVIEW OF SYSTEMS 
ROS Constitutional: Negative for fever, chills, or weight change. Respiratory: Negative for cough or shortness of breath. Cardiovascular: Negative for chest pain or palpitations. Gastrointestinal: Negative for acid reflux, change in bowel habits, or constipation. Genitourinary: Negative for incontinence, dysuria and flank pain. Musculoskeletal: Positive for lumbar pain. Skin: Negative for rash. Neurological: Negative for headaches, dizziness, or numbness. Endo/Heme/Allergies: Negative . Psychiatric/Behavioral: Negative. PHYSICAL EXAMINATION Visit Vitals /74 Pulse 80 Temp 97.3 °F (36.3 °C) Resp 17 Ht 5' 11\" (1.803 m) Wt 277 lb 9.6 oz (125.9 kg) SpO2 93% BMI 38.72 kg/m² Constitutional: Well developed,  well nourished,  awake, alert, and in no acute distress. Neurological:  Sensation to light touch is intact. Psychiatric: Affect and mood are appropriate. Integumentary: No rashes or abrasions noted on exposed areas,  warm, dry and intact. Cardiovascular/Peripheral Vascular:  No peripheral edema is noted. Lymphatic:  No evidence of lymphedema. No cervical lymphadenopathy. SPINE/MUSCULOSKELETAL EXAM 
Lumbar spine: No rash, ecchymosis, or gross obliquity. No fasciculations. No focal atrophy is noted. Range of motion is intact. Tenderness to palpation to lumbar spine. SI joints non-tender. Trochanters non tender.   
  
Musculoskeletal: pain with extension, axial loading, or forward flexion. No pain with internal or external rotation of his hips.  
  
MOTOR 
  
  Hip Flex  Quads Hamstrings Ankle DF EHL Ankle PF Right +4/5 +4/5 +4/5 +4/5 +4/5 +4/5 Left +4/5 +4/5 +4/5 +4/5 +4/5 +4/5  
  
  
Straight Leg raise negative bilateraly.  
  
normal gait and station 
  
 Ambulation without assistive device. full weight bearing, non-antalgic gait.  
 
 
 
 
Thomas Machuca NP

## 2019-02-08 DIAGNOSIS — M48.062 SPINAL STENOSIS OF LUMBAR REGION WITH NEUROGENIC CLAUDICATION: ICD-10-CM

## 2019-02-08 DIAGNOSIS — M96.1 LUMBAR POST-LAMINECTOMY SYNDROME: ICD-10-CM

## 2019-02-08 DIAGNOSIS — M54.50 LOW BACK PAIN, EPISODIC: ICD-10-CM

## 2019-02-08 DIAGNOSIS — Z98.1 S/P LUMBAR SPINAL FUSION: ICD-10-CM

## 2019-02-08 DIAGNOSIS — Z79.891 ENCOUNTER FOR LONG-TERM OPIATE ANALGESIC USE: ICD-10-CM

## 2019-02-08 NOTE — TELEPHONE ENCOUNTER
& Print Context: Rusty Castellanos Last Visit: 1/7/19 Next Appt: 4/15/19 Previous Refill Encounter: 1/16-60+0 Requested Prescriptions Pending Prescriptions Disp Refills  oxyCODONE-acetaminophen (PERCOCET 10)  mg per tablet 60 Tab 0 Sig: Take 1 Tab by mouth every twelve (12) hours as needed for Pain. Max Daily Amount: 2 Tabs.

## 2019-02-11 RX ORDER — OXYCODONE AND ACETAMINOPHEN 10; 325 MG/1; MG/1
1 TABLET ORAL
Qty: 60 TAB | Refills: 0 | Status: SHIPPED | OUTPATIENT
Start: 2019-02-16

## 2019-02-11 RX ORDER — OXYCODONE AND ACETAMINOPHEN 10; 325 MG/1; MG/1
1 TABLET ORAL
Qty: 60 TAB | Refills: 0 | Status: SHIPPED | OUTPATIENT
Start: 2019-03-18

## 2019-02-22 ENCOUNTER — DOCUMENTATION ONLY (OUTPATIENT)
Dept: ORTHOPEDIC SURGERY | Age: 74
End: 2019-02-22

## 2019-02-22 NOTE — PROGRESS NOTES
Patients wife called to cancel 4/15 appointment and advise us that they are relocating out of state.

## (undated) DEVICE — STER SINGLE BASIN SET W/BOWLS: Brand: CARDINAL HEALTH

## (undated) DEVICE — GOWN,PREVENTION PLUS,XLN/XL,ST,24/CS: Brand: MEDLINE

## (undated) DEVICE — CATH URETH FOL 2W SH 20FRX5 --

## (undated) DEVICE — Z DUP USE 2565107 PACK SURG PROC LEG CYSTO T-DRAPE REINF TBL CVR HND TWL

## (undated) DEVICE — Device: Brand: OLYMPUS

## (undated) DEVICE — GAUZE,SPONGE,4"X4",16PLY,STRL,LF,10/TRAY: Brand: MEDLINE

## (undated) DEVICE — Z DISCONTINUED BY MEDLINE USE 2711682 TRAY SKIN PREP DRY W/ PREM GLV

## (undated) DEVICE — (D)SYR 10ML 1/5ML GRAD NSAF -- PKGING CHANGE USE ITEM 338027

## (undated) DEVICE — REM POLYHESIVE ADULT PATIENT RETURN ELECTRODE: Brand: VALLEYLAB

## (undated) DEVICE — FLEX ADVANTAGE 3000CC: Brand: FLEX ADVANTAGE

## (undated) DEVICE — SOLUTION IRRIG 3000ML 0.9% SOD CHL FLX CONT 0797208] ICU MEDICAL INC]

## (undated) DEVICE — 3M™ BAIR PAWS FLEX™ WARMING GOWN, STANDARD, 20 PER CASE 81003: Brand: BAIR PAWS™

## (undated) DEVICE — EVACUATOR URO BLDR W/ ADPT UROVAC

## (undated) DEVICE — KIT CLN UP BON SECOURS MARYV

## (undated) DEVICE — SOLUTION IV 1000ML 0.9% SOD CHL

## (undated) DEVICE — KENDALL SCD EXPRESS SLEEVES, KNEE LENGTH, MEDIUM: Brand: KENDALL SCD

## (undated) DEVICE — ELECTRD CUT LP 24FR --

## (undated) DEVICE — CATHETER FOL 3 W 22 FR 30 CC URETH SPEC HEMA LUBRICATH

## (undated) DEVICE — (D)LUB GEL SURG SURGLUB 2OZ -- DISC BY MFR USE ITEM 292507

## (undated) DEVICE — BAG DRAINAGE CUST DISP

## (undated) DEVICE — TUBING IRRIG L77IN DIA0.241IN L BOR FOR CYSTO W/ NVENT

## (undated) DEVICE — STRAP CATH CTH-SECUR UNIV 2IN --

## (undated) DEVICE — URINARY LEG BAG COMBINATION PACK: Brand: HOLLISTER